# Patient Record
Sex: FEMALE | Race: WHITE
[De-identification: names, ages, dates, MRNs, and addresses within clinical notes are randomized per-mention and may not be internally consistent; named-entity substitution may affect disease eponyms.]

---

## 2018-12-28 ENCOUNTER — HOSPITAL ENCOUNTER (INPATIENT)
Dept: HOSPITAL 74 - JER | Age: 78
LOS: 3 days | Discharge: HOME | DRG: 194 | End: 2018-12-31
Attending: SPECIALIST | Admitting: SPECIALIST
Payer: MEDICARE

## 2018-12-28 VITALS — BODY MASS INDEX: 25.7 KG/M2

## 2018-12-28 DIAGNOSIS — J10.1: Primary | ICD-10-CM

## 2018-12-28 DIAGNOSIS — M48.061: ICD-10-CM

## 2018-12-28 DIAGNOSIS — E86.0: ICD-10-CM

## 2018-12-28 DIAGNOSIS — B96.1: ICD-10-CM

## 2018-12-28 DIAGNOSIS — R55: ICD-10-CM

## 2018-12-28 DIAGNOSIS — N39.0: ICD-10-CM

## 2018-12-28 DIAGNOSIS — E78.5: ICD-10-CM

## 2018-12-28 DIAGNOSIS — F03.90: ICD-10-CM

## 2018-12-28 LAB
ALBUMIN SERPL-MCNC: 3.4 G/DL (ref 3.4–5)
ALP SERPL-CCNC: 72 U/L (ref 45–117)
ALT SERPL-CCNC: 22 U/L (ref 13–61)
ANION GAP SERPL CALC-SCNC: 7 MMOL/L (ref 8–16)
APPEARANCE UR: (no result)
AST SERPL-CCNC: 35 U/L (ref 15–37)
BACTERIA #/AREA URNS HPF: (no result) /HPF
BASOPHILS # BLD: 0.8 % (ref 0–2)
BILIRUB SERPL-MCNC: 0.2 MG/DL (ref 0.2–1)
BILIRUB UR STRIP.AUTO-MCNC: NEGATIVE MG/DL
BUN SERPL-MCNC: 11 MG/DL (ref 7–18)
CALCIUM SERPL-MCNC: 8.4 MG/DL (ref 8.5–10.1)
CHLORIDE SERPL-SCNC: 108 MMOL/L (ref 98–107)
CO2 SERPL-SCNC: 27 MMOL/L (ref 21–32)
COLOR UR: (no result)
CREAT SERPL-MCNC: 0.9 MG/DL (ref 0.55–1.3)
DEPRECATED RDW RBC AUTO: 13.4 % (ref 11.6–15.6)
EOSINOPHIL # BLD: 0.5 % (ref 0–4.5)
EPITH CASTS URNS QL MICRO: (no result) /HPF
GLUCOSE SERPL-MCNC: 150 MG/DL (ref 74–106)
HCT VFR BLD CALC: 37.2 % (ref 32.4–45.2)
HGB BLD-MCNC: 13.1 GM/DL (ref 10.7–15.3)
INR BLD: 1.06 (ref 0.83–1.09)
KETONES UR QL STRIP: (no result)
LEUKOCYTE ESTERASE UR QL STRIP.AUTO: (no result)
LYMPHOCYTES # BLD: 27.6 % (ref 8–40)
MAGNESIUM SERPL-MCNC: 2.4 MG/DL (ref 1.8–2.4)
MCH RBC QN AUTO: 31.7 PG (ref 25.7–33.7)
MCHC RBC AUTO-ENTMCNC: 35.2 G/DL (ref 32–36)
MCV RBC: 90.1 FL (ref 80–96)
MONOCYTES # BLD AUTO: 8.5 % (ref 3.8–10.2)
MUCOUS THREADS URNS QL MICRO: (no result)
NEUTROPHILS # BLD: 62.6 % (ref 42.8–82.8)
NITRITE UR QL STRIP: NEGATIVE
PH UR: 6 [PH] (ref 5–8)
PLATELET # BLD AUTO: 228 K/MM3 (ref 134–434)
PMV BLD: 8.1 FL (ref 7.5–11.1)
POTASSIUM SERPLBLD-SCNC: 4.4 MMOL/L (ref 3.5–5.1)
PROT SERPL-MCNC: 6.6 G/DL (ref 6.4–8.2)
PROT UR QL STRIP: NEGATIVE
PROT UR QL STRIP: NEGATIVE
PT PNL PPP: 12.5 SEC (ref 9.7–13)
RBC # BLD AUTO: 4.13 M/MM3 (ref 3.6–5.2)
SODIUM SERPL-SCNC: 141 MMOL/L (ref 136–145)
SP GR UR: 1.01 (ref 1.01–1.03)
UROBILINOGEN UR STRIP-MCNC: NEGATIVE MG/DL (ref 0.2–1)
WBC # BLD AUTO: 6.2 K/MM3 (ref 4–10)

## 2018-12-28 RX ADMIN — ATORVASTATIN CALCIUM SCH MG: 10 TABLET, FILM COATED ORAL at 21:48

## 2018-12-28 RX ADMIN — RANITIDINE SCH MG: 150 TABLET ORAL at 21:48

## 2018-12-28 NOTE — ECHO
______________________________________________________________________________



Name: ZACHARY CRUZ                                     Exam:Adult Echocardiogram

MRN: [id number]         Study Date: 12/28/2018 03:12 PM

Age: 78 yrs

______________________________________________________________________________



Reason For Study: SYNCOPE

Height: 63 in        Weight: 140 lb        BSA: 1.7 m2



______________________________________________________________________________



MMode/2D Measurements & Calculations

IVSd: 1.0 cm                                            Ao root diam: 2.3 cm

LVIDd: 3.7 cm                                           ACS: 1.9 cm

LVIDs: 2.9 cm

LVPWd: 1.5 cm



_________________________________________________________

EDV(Teich): 58.0 ml                                     LVOT diam: 1.9 cm

ESV(Teich): 31.2 ml



_________________________________________________________

RV S Umesh: 10.7 cm/sec



Doppler Measurements & Calculations

Med Peak E' Umesh: 6.5 cm/sec

Lat Peak E' Umesh: 8.1 cm/sec





______________________________________________________________________________

Left Ventricle

Left ventricular systolic function is normal. Ejection Fraction = 50-55%.

Right Ventricle

The right ventricle is normal in size and function.

Atria

Normal left and right atrial size and function.

Mitral Valve

The mitral valve is normal in structure and function. There is no mitral valve stenosis. There is mil
d mitral

regurgitation.

Tricuspid Valve

The tricuspid valve is normal in structure and function.

Aortic Valve

The aortic valve opens well. There is mild aortic sclerosis.;. No hemodynamically significant valvula
r aortic

stenosis. No aortic regurgitation is present.

Pulmonic Valve

The pulmonic valve is not well seen, but is grossly normal. There is no pulmonic valvular stenosis. T
here is

no pulmonic valvular regurgitation.

Great Vessels

The aortic root is normal size.

Pericardium/Pleura

There is pericardial thickening and/or a small pericardial effusion. The pericardial space suggests o
rganizing

pericardial effusion. There are no echocardiographic indications of cardiac tamponade.



______________________________________________________________________________



Interpretation Summary

Left ventricular systolic function is normal.

Ejection Fraction = 50-55%.

The right ventricle is normal in size and function.

There is mild mitral regurgitation.

The aortic valve opens well.

There is mild aortic sclerosis.;

There is pericardial thickening and/or a small pericardial effusion.

The pericardial space suggests organizing pericardial effusion.

There are no echocardiographic indications of cardiac tamponade.







MD Rand *Vivian 12/28/2018 03:50 PM

## 2018-12-28 NOTE — PDOC
History of Present Illness





- General


Chief Complaint: Syncope/Near Syncope


Stated Complaint: SYNCOPE/NEAR SYNCOPE


Time Seen by Provider: 12/28/18 08:29


History Source: Patient


Exam Limitations: No Limitations





- History of Present Illness


Initial Comments: 





12/28/18 10:13


Patient is a 78-year-old female with past medical history of hyperlipidemia, 

who presents to the emergency department today for syncopal episode occurring 

this morning. She states she got out of bed and was walking to the kitchen when 

she passed out. She states that she does not remember what happened and she 

woke up on the floor. She denies headache, chest pain, difficulty breathing, 

nausea prior to passing out. When she woke up she noticed a large bump to her 

forehead. Pt admits to cold like symptoms over the past three days. 








Past History





- Travel


Traveled outside of the country in the last 30 days: No


Close contact w/someone who was outside of country & ill: No





- Past Medical History


Allergies/Adverse Reactions: 


 Allergies











Allergy/AdvReac Type Severity Reaction Status Date / Time


 


No Known Allergies Allergy   Verified 12/28/18 08:22











Home Medications: 


Ambulatory Orders





Ranitidine [Zantac -] 150 mg PO ASDIR 12/28/18 


Simvastatin [Zocor -] mg PO HS 12/28/18 


Zolpidem Tartrate [Ambien] 5 mg PO HS 12/28/18 








COPD: No


GI Disorders: Yes (reflux)


Hypercholesterolemia: Yes


Other medical history: insomnia





- Surgical History


Appendectomy: Yes





- Suicide/Smoking/Psychosocial Hx


Smoking History: Never smoked


Hx Alcohol Use: No


Drug/Substance Use Hx: No


Substance Use Type: None





**Review of Systems





- Review of Systems


Able to Perform ROS?: Yes


Comments:: 





12/28/18 08:45


CONSTITUTIONAL: 


Absent: fever, chills, diaphoresis, generalized weakness, malaise, loss of 

appetite


HEENT: 


Present: throat pain Absent: rhinorrhea, nasal congestion,throat swelling, 

difficulty swallowing, mouth swelling, ear pain, eye pain, visual Changes


CARDIOVASCULAR: 


Present: LOC Absent: chest pain, lpalpitations, irregular heart rate, 

peripheral edema


RESPIRATORY: 


Present: cough Absent: shortness of breath, dyspnea with exertion, orthopnea, 

wheezing, stridor, hemoptysis


GASTROINTESTINAL:


Present: nausea Absent: abdominal pain, abdominal distension, vomiting, diarrhea

, constipation, melena, hematochezia


GENITOURINARY: 


Absent: dysuria, frequency, urgency, hesitancy, hematuria, flank pain, genital 

pain


MUSCULOSKELETAL: 


Absent: myalgia, arthralgia, joint swelling


SKIN: 


Absent: rash, itching, pallor


HEMATOLOGIC/IMMUNOLOGIC: 


Absent: easy bleeding, easy bruising, lymphadenopathy, frequent infections


ENDOCRINE:


Absent: unexplained weight gain, unexplained weight loss, heat intolerance, 

cold intolerance


NEUROLOGIC: 


Absent: headache, focal weakness or paresthesias, dizziness, unsteady gait, 

seizure, mental status changes, bladder or bowel incontinence


PSYCHIATRIC: 


Absent: anxiety, depression, suicidal or homicidal ideation, hallucinations.


Is the patient limited English proficient: No





*Physical Exam





- Vital Signs


 Last Vital Signs











Temp Pulse Resp BP Pulse Ox


 


 98.3 F   74   20   123/58 L  96 


 


 12/28/18 08:25  12/28/18 08:25  12/28/18 08:25  12/28/18 08:25  12/28/18 08:25














- Physical Exam


Comments: 





12/28/18 08:50


GENERAL:


Well developed, well nourished. Awake and alert. No acute distress. Large 

hematoma to the L forehead.


HEENT:


Normocephalic, atraumatic. PERRLA, EOMI. No conjunctival pallor. Sclera are non-

icteric. Moist mucous membranes. Oropharynx is clear.


NECK: 


Supple. Full ROM. No midline tenderness. No JVD. Carotid pulses 2+ and symmetric

, without bruits. No thyromegaly. No lymphadenopathy.


CARDIOVASCULAR:


Regular rate and rhythm. No murmurs, rubs, or gallops. Distal pulses are 2+ and 

symmetric. 


PULMONARY: 


No evidence of respiratory distress. Lungs clear to auscultation bilaterally. 

No wheezing, rales or rhonchi.


ABDOMINAL:


Soft. Non-tender. Non-distended. No rebound or guarding. No organomegaly. 

Normoactive bowel sounds. 


MUSCULOSKELETAL 


Normal range of motion at all joints. No bony deformities or tenderness. No CVA 

tenderness.


EXTREMITIES: 


No cyanosis. No clubbing. No edema. No calf tenderness.


SKIN: 


Warm and dry. Normal capillary refill. No rashes. No jaundice. 


NEUROLOGICAL: 


Alert, awake, appropriate. Cranial nerves 2-12 intact. No deficits to light 

touch and temperature in face, upper extremities and lower extremities. No 

motor deficits in the in face, upper extremities and lower extremities. 

Normoreflexic in the upper and lower extremities. Normal speech. Toes are down-

going bilaterally. Gait is normal without ataxia.


PSYCHIATRIC: 


Cooperative. Good eye contact. Appropriate mood and affect.





Moderate Sedation





- Procedure Monitoring


Vital Signs: 


Procedure Monitoring Vital Signs











Temperature  98.3 F   12/28/18 08:25


 


Pulse Rate  74   12/28/18 08:25


 


Respiratory Rate  20   12/28/18 08:25


 


Blood Pressure  123/58 L  12/28/18 08:25


 


O2 Sat by Pulse Oximetry (%)  96   12/28/18 08:25











**Heart Score/ECG Review





- History


History: Moderately suspicious





- Electrocardiogram


EKG: Normal





- Age


Age: >/= 65





- Risk Factors


Risk Factors Heart Score: Yes Hx Hypercholesterolemia


Based on the list above the patient has:: 1-2 risk factors





- Troponin


Troponin: </= normal limit





- Score


Heart Score - Total: 4





ED Treatment Course





- LABORATORY


CBC & Chemistry Diagram: 


 12/28/18 09:10





 12/28/18 09:10





Medical Decision Making





- Medical Decision Making





12/28/18 12:34


Pt is a 77 y/o F with PMH of hyperlipidemia, who presents to the ED for a 

syncopal episode this morning. Pt also states she had cold like symptoms for 

the past 3 days





-On exam, pt is AAOx3, neurologically intact. Hematoma to the L scalp. No 

lacerations noted. No pain to neck, trunk or extremities.


-Pt had second near-syncopal episode in the ED. Episode lasted approximately 30 

seconds and pt became bradycardic and diaphoretic. Symptoms resolved 

themselves. 


-Lab work shows no leukocytosis, or shift. H&H is stable. Troponin negative.


-Lactic acid elevated at 2.2; two liters of fluid given.


-Flu A positive


-Tamiflu given


-Head CT and C-Spine CT are negative for fractures, or bleed


-EKG: rate 69 BPM, NSR. Normal intervals and axis. No acute ST-T wave changes


-Suspect syncopal episodes from dehydration related to influenza diagnosis, but 

will admit to tele for further cardiovascular work up


-Case discussed with TRINI Santiago for Dr. Lopez. Agrees to admission.








*DC/Admit/Observation/Transfer


Diagnosis at time of Disposition: 


 Influenza A





Syncope


Qualifiers:


 Syncope type: unspecified Qualified Code(s): R55 - Syncope and collapse








- Discharge Dispostion


Condition at time of disposition: Guarded


Decision to Admit order: Yes





- Referrals





- Patient Instructions





- Post Discharge Activity

## 2018-12-29 LAB
ANION GAP SERPL CALC-SCNC: 5 MMOL/L (ref 8–16)
BASOPHILS # BLD: 0.7 % (ref 0–2)
BUN SERPL-MCNC: 8 MG/DL (ref 7–18)
CALCIUM SERPL-MCNC: 8.3 MG/DL (ref 8.5–10.1)
CHLORIDE SERPL-SCNC: 109 MMOL/L (ref 98–107)
CO2 SERPL-SCNC: 28 MMOL/L (ref 21–32)
CREAT SERPL-MCNC: 0.7 MG/DL (ref 0.55–1.3)
DEPRECATED RDW RBC AUTO: 13.5 % (ref 11.6–15.6)
EOSINOPHIL # BLD: 0.9 % (ref 0–4.5)
GLUCOSE SERPL-MCNC: 84 MG/DL (ref 74–106)
HCT VFR BLD CALC: 33.8 % (ref 32.4–45.2)
HGB BLD-MCNC: 11.9 GM/DL (ref 10.7–15.3)
LYMPHOCYTES # BLD: 58.7 % (ref 8–40)
MCH RBC QN AUTO: 31.6 PG (ref 25.7–33.7)
MCHC RBC AUTO-ENTMCNC: 35.2 G/DL (ref 32–36)
MCV RBC: 89.9 FL (ref 80–96)
MONOCYTES # BLD AUTO: 9.5 % (ref 3.8–10.2)
NEUTROPHILS # BLD: 30.2 % (ref 42.8–82.8)
PLATELET # BLD AUTO: 196 K/MM3 (ref 134–434)
PMV BLD: 7.7 FL (ref 7.5–11.1)
POTASSIUM SERPLBLD-SCNC: 4.3 MMOL/L (ref 3.5–5.1)
RBC # BLD AUTO: 3.77 M/MM3 (ref 3.6–5.2)
SODIUM SERPL-SCNC: 143 MMOL/L (ref 136–145)
WBC # BLD AUTO: 3.2 K/MM3 (ref 4–10)

## 2018-12-29 RX ADMIN — RANITIDINE SCH MG: 150 TABLET ORAL at 21:20

## 2018-12-29 RX ADMIN — OSELTAMIVIR PHOSPHATE SCH MG: 75 CAPSULE ORAL at 21:19

## 2018-12-29 RX ADMIN — RANITIDINE SCH MG: 150 TABLET ORAL at 12:00

## 2018-12-29 RX ADMIN — GUAIFENESIN SCH MG: 600 TABLET, EXTENDED RELEASE ORAL at 21:20

## 2018-12-29 RX ADMIN — ATORVASTATIN CALCIUM SCH MG: 10 TABLET, FILM COATED ORAL at 21:19

## 2018-12-29 RX ADMIN — IPRATROPIUM BROMIDE AND ALBUTEROL SULFATE SCH AMP: .5; 3 SOLUTION RESPIRATORY (INHALATION) at 20:18

## 2018-12-29 NOTE — CON.NEURO
Consult


Consult Specialty:: NEUROLOGY-MYNOR GUAMAN





- History of Present Illness


History of Present Illness: 





Patient is a 78-year-old female with past medical history of hyperlipidemia, 

who presents to the emergency department today for syncopal episode occurring 

this morning. She states she got out of bed and was walking to the kitchen when 

she passed out. She states that she does not remember what happened and she 

woke up on the floor. She denies headache, chest pain, difficulty breathing, 

nausea prior to passing out. When she woke up she noticed a large bump to her 

forehead. Pt admits to cold like symptoms over the past three days. 


Pt. tells me she had a sensation "as if i am going to pass out "(lightheadedness

") before passing out. Denies numbness/weakness/extranneous movements and all 

other neurologic symptoms. 





ED Note-Pt had second near-syncopal episode in the ED. Episode lasted 

approximately 30 seconds and pt became bradycardic and diaphoretic. Symptoms 

resolved themselves. 


-Lab work shows no leukocytosis, or shift. H&H is stable. Troponin negative.


-Lactic acid elevated at 2.2; two liters of fluid given.


-Flu A positive


-Tamiflu given


-Head CT and C-Spine CT are negative for fractures, or bleed


-EKG: rate 69 BPM, NSR. Normal intervals and axis. No acute ST-T wave changes


-Suspect syncopal episodes from dehydration related to influenza diagnosis, but 

will admit to tele for further cardiovascular work up


-Case discussed with TRINI Santiago for Dr. Lopez. Agrees to admission.





- Past Medical History


CNS: Yes: Dementia


...Pregnant: No





- Alcohol/Substance Use


Hx Alcohol Use: No





- Smoking History


Smoking history: Never smoked


Have you smoked in the past 12 months: No





- Social History


ADL: Family Assistance





Home Medications





- Allergies


Allergies/Adverse Reactions: 


 Allergies











Allergy/AdvReac Type Severity Reaction Status Date / Time


 


No Known Allergies Allergy   Verified 12/28/18 08:22














- Home Medications


Home Medications: 


Ambulatory Orders





Ranitidine [Zantac -] 150 mg PO ASDIR 12/28/18 


Simvastatin [Zocor -] mg PO HS 12/28/18 


Zolpidem Tartrate [Ambien] 5 mg PO HS 12/28/18 











Physical Exam-Neuro


Vital Signs: 


 Vital Signs











Temperature  97.9 F   12/29/18 06:00


 


Pulse Rate  58 L  12/29/18 06:00


 


Respiratory Rate  18   12/29/18 06:00


 


Blood Pressure  127/63   12/29/18 06:00


 


O2 Sat by Pulse Oximetry (%)  96   12/29/18 09:00











Labs: 


 CBC, BMP





 12/29/18 07:00 





 12/29/18 07:00 





 INR, PTT











INR  1.06  (0.83-1.09)   12/28/18  09:10    














- Neuro Exam


Level Of Consciousness: Yes: Alert, Oriented to Person, Oriented to Place, 

Oriented to Time


Mini Mental Exam: Intact att/conc


DTR's: 1+ Left Brachioradialis, 1+ Right Brachioradialis, 1+ Left Achilles, 1+ 

Right Achilles, 2+ Left Bicep, 2+ Right Bicep, 2+ Left Tricep, 2+ Right Tricep


Motor Strength: 5/5: Left Arm, Right Arm, Left Leg, Right Leg


Gait: Normal





Imaging





- Results


Cat Scan: Report Reviewed (Without acute abn.)





Assessment/Plan





Pt. appears to have had a syncopal episode?? dehydration/influenza. No evid. 

she had a seizure. Would cont. current care, please call as needed.





Thank Razia gonsalez MD

## 2018-12-29 NOTE — HP
Admitting History and Physical





- Admission


History of Present Illness: 








Patient is a 78-year-old female with past medical history of hyperlipidemia, 

who presents to the emergency department today for syncopal episode occurring 

this morning. She states she got out of bed and was walking to the kitchen when 

she passed out. She states that she does not remember what happened and she 

woke up on the floor. She denies headache, chest pain, difficulty breathing, 

nausea prior to passing out. When she woke up she noticed a large bump to her 

forehead. Pt admits to cold like symptoms over the past three days. 





History Source: Patient, Family Member, Medical Record


Limitations to Obtaining History: Poor Historian





- Past Medical History


CNS: Yes: Dementia


Reproductive: Yes: Postmenopausal


...Pregnant: No





- Smoking History


Smoking history: Never smoked


Have you smoked in the past 12 months: No





- Alcohol/Substance Use


Hx Alcohol Use: No





- Social History


Usual Living Arrangement: Yes: With Child


ADL: Family Assistance





Home Medications





- Allergies


Allergies/Adverse Reactions: 


 Allergies











Allergy/AdvReac Type Severity Reaction Status Date / Time


 


No Known Allergies Allergy   Verified 12/28/18 08:22














- Home Medications


Home Medications: 


Ambulatory Orders





Ranitidine [Zantac -] 150 mg PO ASDIR 12/28/18 


Simvastatin [Zocor -] mg PO HS 12/28/18 


Zolpidem Tartrate [Ambien] 5 mg PO HS 12/28/18 











Review of Systems





- Review of Systems


Constitutional: reports: Chills, Fever, Loss of Appetite, Malaise, Weakness


Eyes: reports: No Symptoms


HENT: reports: No Symptoms


Neck: reports: No Symptoms


Cardiovascular: denies: Chest Pain, Palpitations


Respiratory: reports: Cough, Wheezing.  denies: SOB, SOB on Exertion


Gastrointestinal: reports: No Symptoms


Genitourinary: reports: No Symptoms


Breasts: reports: No Symptoms Reported


Musculoskeletal: reports: No Symptoms


Integumentary: reports: No Symptoms


Neurological: reports: Pre-Existing Deficit


Hematology/Lymphatic: reports: No Symptoms


Psychiatric: reports: Depression





Physical Examination


Vital Signs: 


 Vital Signs











Temperature  97.9 F   12/29/18 06:00


 


Pulse Rate  58 L  12/29/18 06:00


 


Respiratory Rate  18   12/29/18 06:00


 


Blood Pressure  127/63   12/29/18 06:00


 


O2 Sat by Pulse Oximetry (%)  96   12/29/18 09:00











Labs: 


 CBC, BMP





 12/29/18 07:00 





 12/29/18 07:00 











Problem List





- Problems


(1) Dementia


Code(s): F03.90 - UNSPECIFIED DEMENTIA WITHOUT BEHAVIORAL DISTURBANCE   





(2) Influenza A


Code(s): J10.1 - FLU DUE TO OTH IDENT INFLUENZA VIRUS W OTH RESP MANIFEST   





(3) Syncope


Code(s): R55 - SYNCOPE AND COLLAPSE   


Qualifiers: 


   Syncope type: unspecified   Qualified Code(s): R55 - Syncope and collapse   





(4) Spinal stenosis at L4-L5 level


Code(s): M48.06 - SPINAL STENOSIS, LUMBAR REGION * DO NOT USE *

## 2018-12-29 NOTE — PN
Progress Note (short form)





- Note


Progress Note: 





patient seen and examined in room 


daughter at bedside 


coughing 


tokerating PO well





 Vital Signs











 Period  Temp  Pulse  Resp  BP Sys/Acosta  Pulse Ox


 


 Last 24 Hr  97.9 F-99.4 F  52-72  18-20  110-134/57-74  96-98








neck  supple


heart  S1/S2 re


lung  coarse rhonchi bilat 


abd  soft non tender 


ext  no edema 


      no calf tenderness 








 CBC, BMP





 12/29/18 07:00 





 12/29/18 07:00 





= influenza ++





 Microbiology





12/28/18 11:30   Urine - Urine Clean Catch   Urine Culture - Preliminary


                            Lactose Fermenting Neg Bacilli





Active Medications





Albuterol/Ipratropium (Duoneb -)  1 amp NEB RQID KRYSTIN


Atorvastatin Calcium (Lipitor -)  10 mg PO HS [facility]


   Last Admin: 12/28/18 21:48 Dose:  10 mg


Guaifenesin (Mucinex -)  600 mg PO BID KRYSTIN


Oseltamivir Phosphate (Tamiflu -)  75 mg PO BID KRYSTIN


   Stop: 01/03/19 21:59


Ranitidine HCl (Zantac -)  150 mg PO BID [facility]


   Last Admin: 12/29/18 12:00 Dose:  150 mg


Zolpidem Tartrate (Ambien -)  5 mg PO HS PRN


   PRN Reason: INSOMNIA





   # Syncope 


      2/2 dehydaration / influenza 





   # INfluenza 


      tamiflu 


      nebulizer QID





probable  d/c  in am 














Problem List





- Problems


(1) Dementia


Code(s): F03.90 - UNSPECIFIED DEMENTIA WITHOUT BEHAVIORAL DISTURBANCE   





(2) Influenza A


Code(s): J10.1 - FLU DUE TO OTH IDENT INFLUENZA VIRUS W OTH RESP MANIFEST   





(3) Syncope


Code(s): R55 - SYNCOPE AND COLLAPSE   


Qualifiers: 


   Syncope type: unspecified   Qualified Code(s): R55 - Syncope and collapse   





(4) Spinal stenosis at L4-L5 level


Code(s): M48.06 - SPINAL STENOSIS, LUMBAR REGION * DO NOT USE *

## 2018-12-30 RX ADMIN — OSELTAMIVIR PHOSPHATE SCH MG: 75 CAPSULE ORAL at 10:43

## 2018-12-30 RX ADMIN — CEFTRIAXONE SCH MLS/HR: 1 INJECTION, POWDER, FOR SOLUTION INTRAMUSCULAR; INTRAVENOUS at 15:15

## 2018-12-30 RX ADMIN — OSELTAMIVIR PHOSPHATE SCH MG: 75 CAPSULE ORAL at 21:13

## 2018-12-30 RX ADMIN — METHYLPREDNISOLONE SODIUM SUCCINATE SCH MG: 40 INJECTION, POWDER, FOR SOLUTION INTRAMUSCULAR; INTRAVENOUS at 15:15

## 2018-12-30 RX ADMIN — IPRATROPIUM BROMIDE AND ALBUTEROL SULFATE SCH AMP: .5; 3 SOLUTION RESPIRATORY (INHALATION) at 12:00

## 2018-12-30 RX ADMIN — METHYLPREDNISOLONE SODIUM SUCCINATE SCH MG: 40 INJECTION, POWDER, FOR SOLUTION INTRAMUSCULAR; INTRAVENOUS at 20:13

## 2018-12-30 RX ADMIN — GUAIFENESIN SCH MG: 600 TABLET, EXTENDED RELEASE ORAL at 10:43

## 2018-12-30 RX ADMIN — ATORVASTATIN CALCIUM SCH MG: 10 TABLET, FILM COATED ORAL at 21:13

## 2018-12-30 RX ADMIN — GUAIFENESIN SCH MG: 600 TABLET, EXTENDED RELEASE ORAL at 21:13

## 2018-12-30 RX ADMIN — IPRATROPIUM BROMIDE AND ALBUTEROL SULFATE SCH AMP: .5; 3 SOLUTION RESPIRATORY (INHALATION) at 16:19

## 2018-12-30 RX ADMIN — RANITIDINE SCH MG: 150 TABLET ORAL at 21:13

## 2018-12-30 RX ADMIN — IPRATROPIUM BROMIDE AND ALBUTEROL SULFATE SCH AMP: .5; 3 SOLUTION RESPIRATORY (INHALATION) at 09:00

## 2018-12-30 RX ADMIN — RANITIDINE SCH MG: 150 TABLET ORAL at 10:43

## 2018-12-30 NOTE — PN
Progress Note (short form)





- Note


Progress Note: 





patient seen and examined in room 


comfortable 


reports has not been able to sleep over night due to wheezing 


on Neb treatment 


just had treatment  and remains with coarse wheezing 


 Vital Signs











 Period  Temp  Pulse  Resp  BP Sys/Acosta  Pulse Ox


 


 Last 24 Hr  97.6 F-98.3 F  52-63  18-20  134-158/57-86  96-99

















 


neck  supple


heart  S1/S2 re


lung  coarse rhonchi bilat 


abd  soft non tender 


ext  no edema 


      no calf tenderness 


 














 CBC, BMP





 12/29/18 07:00 





 12/29/18 07:00 





= influenza ++





  Microbiology





12/28/18 11:30   Urine - Urine Clean Catch   Urine Culture - Final


                            Klebsiella Pneumoniae





Active Medications





Albuterol/Ipratropium (Duoneb -)  1 amp NEB RQID KRYSTIN


   Last Admin: 12/30/18 12:00 Dose:  1 amp


Albuterol/Ipratropium (Duoneb -)  1 amp NEB Q4H PRN


   PRN Reason: SHORTNESS OF BREATH


Atorvastatin Calcium (Lipitor -)  10 mg PO HS KRYSTIN


   Last Admin: 12/29/18 21:19 Dose:  10 mg


Guaifenesin (Mucinex -)  600 mg PO BID KRYSTIN


   Last Admin: 12/30/18 10:43 Dose:  600 mg


Ceftriaxone Sodium 1 gm/ (Dextrose)  100 mls @ 200 mls/hr IVPB DAILY [facility]; 

Protocol


Methylprednisolone Sodium Succinate (Solu-Medrol -)  40 mg IVPUSH Q8H-IV KRYSTIN


Oseltamivir Phosphate (Tamiflu -)  75 mg PO BID KRYSTIN


   Stop: 01/03/19 21:59


   Last Admin: 12/30/18 10:43 Dose:  75 mg


Ranitidine HCl (Zantac -)  150 mg PO BID KRYSTIN


   Last Admin: 12/30/18 10:43 Dose:  150 mg


Zolpidem Tartrate (Ambien -)  5 mg PO HS PRN


   PRN Reason: INSOMNIA








   # Syncope 


      2/2 dehydaration / influenza 





   # INfluenza / bronchospasm 


      tamiflu / expectorant 


      add IV steroids 


      nebulizer QID and PRN 





   # UTI


      Klebsiella  S-Rocephin 


      start today 


























Problem List





- Problems


(1) Dementia


Code(s): F03.90 - UNSPECIFIED DEMENTIA WITHOUT BEHAVIORAL DISTURBANCE   





(2) Influenza A


Code(s): J10.1 - FLU DUE TO OTH IDENT INFLUENZA VIRUS W OTH RESP MANIFEST   





(3) Syncope


Code(s): R55 - SYNCOPE AND COLLAPSE   


Qualifiers: 


   Syncope type: unspecified   Qualified Code(s): R55 - Syncope and collapse   





(4) Spinal stenosis at L4-L5 level


Code(s): M48.06 - SPINAL STENOSIS, LUMBAR REGION * DO NOT USE *

## 2018-12-31 VITALS — HEART RATE: 81 BPM | DIASTOLIC BLOOD PRESSURE: 65 MMHG | TEMPERATURE: 97.9 F | SYSTOLIC BLOOD PRESSURE: 148 MMHG

## 2018-12-31 LAB
ANION GAP SERPL CALC-SCNC: 8 MMOL/L (ref 8–16)
BASOPHILS # BLD: 0.1 % (ref 0–2)
BUN SERPL-MCNC: 17 MG/DL (ref 7–18)
CALCIUM SERPL-MCNC: 9.2 MG/DL (ref 8.5–10.1)
CHLORIDE SERPL-SCNC: 108 MMOL/L (ref 98–107)
CO2 SERPL-SCNC: 26 MMOL/L (ref 21–32)
CREAT SERPL-MCNC: 0.8 MG/DL (ref 0.55–1.3)
DEPRECATED RDW RBC AUTO: 13.1 % (ref 11.6–15.6)
EOSINOPHIL # BLD: 0 % (ref 0–4.5)
GLUCOSE SERPL-MCNC: 144 MG/DL (ref 74–106)
HCT VFR BLD CALC: 38.4 % (ref 32.4–45.2)
HGB BLD-MCNC: 12.6 GM/DL (ref 10.7–15.3)
LYMPHOCYTES # BLD: 23.3 % (ref 8–40)
MCH RBC QN AUTO: 29.8 PG (ref 25.7–33.7)
MCHC RBC AUTO-ENTMCNC: 32.7 G/DL (ref 32–36)
MCV RBC: 91.1 FL (ref 80–96)
MONOCYTES # BLD AUTO: 2.1 % (ref 3.8–10.2)
NEUTROPHILS # BLD: 74.5 % (ref 42.8–82.8)
PLATELET # BLD AUTO: 220 K/MM3 (ref 134–434)
PMV BLD: 8.3 FL (ref 7.5–11.1)
POTASSIUM SERPLBLD-SCNC: 4.8 MMOL/L (ref 3.5–5.1)
RBC # BLD AUTO: 4.21 M/MM3 (ref 3.6–5.2)
SODIUM SERPL-SCNC: 142 MMOL/L (ref 136–145)
WBC # BLD AUTO: 3.8 K/MM3 (ref 4–10)

## 2018-12-31 RX ADMIN — RANITIDINE SCH MG: 150 TABLET ORAL at 10:11

## 2018-12-31 RX ADMIN — OSELTAMIVIR PHOSPHATE SCH MG: 75 CAPSULE ORAL at 10:12

## 2018-12-31 RX ADMIN — IPRATROPIUM BROMIDE AND ALBUTEROL SULFATE SCH AMP: .5; 3 SOLUTION RESPIRATORY (INHALATION) at 11:21

## 2018-12-31 RX ADMIN — METHYLPREDNISOLONE SODIUM SUCCINATE SCH MG: 40 INJECTION, POWDER, FOR SOLUTION INTRAMUSCULAR; INTRAVENOUS at 01:49

## 2018-12-31 RX ADMIN — CEFTRIAXONE SCH MLS/HR: 1 INJECTION, POWDER, FOR SOLUTION INTRAMUSCULAR; INTRAVENOUS at 10:11

## 2018-12-31 RX ADMIN — GUAIFENESIN SCH MG: 600 TABLET, EXTENDED RELEASE ORAL at 10:11

## 2018-12-31 RX ADMIN — IPRATROPIUM BROMIDE AND ALBUTEROL SULFATE SCH AMP: .5; 3 SOLUTION RESPIRATORY (INHALATION) at 07:47

## 2018-12-31 RX ADMIN — METHYLPREDNISOLONE SODIUM SUCCINATE SCH MG: 40 INJECTION, POWDER, FOR SOLUTION INTRAMUSCULAR; INTRAVENOUS at 10:11

## 2018-12-31 NOTE — DS
Physical Examination


Vital Signs: 


 Vital Signs











Temperature  98.1 F   12/31/18 05:23


 


Pulse Rate  59 L  12/31/18 05:23


 


Respiratory Rate  18   12/31/18 05:23


 


Blood Pressure  146/65   12/31/18 05:23


 


O2 Sat by Pulse Oximetry (%)  96   12/30/18 20:52











Constitutional: Yes: Well Nourished


Eyes: Yes: Conjunctiva Clear


HENT: Yes: Normocephalic, Other (Hematoma lt side of forehead)


Cardiovascular: Yes: Regular Rate and Rhythm


Respiratory: Yes: Regular, CTA Bilaterally


Gastrointestinal: Yes: Normal Bowel Sounds, Soft


...Rectal Exam: Yes: Deferred


Renal/: Yes: WNL


Breast(s): Yes: WNL


Musculoskeletal: Yes: WNL


Extremities: Yes: WNL


Edema: No


Peripheral Pulses WNL: Yes


Integumentary: Yes: WNL


Neurological: Yes: Alert, Oriented


...Motor Strength: WNL


Psychiatric: Yes: Alert, Oriented


Labs: 


 CBC, BMP





 12/31/18 05:30 





 12/31/18 05:30 











Discharge Summary


Reason For Visit: INFLUENZA TYPE A; SYNCOPE


Current Active Problems





Dementia (Acute)


Influenza A (Acute)


Syncope (Acute)








Hospital Course: 





79 y/o female with PMHx of HLD was admitted s/p fall after syncopal episode. 

Treated for flu. Pos UTI. DC home with Bactrim, Tamiflu and


Nebulizer txs. VNS referral for safety/ fall precautions.


Condition: Good





- Instructions


Referrals: 


Sulma Lopez MD [Primary Care Provider] - 





- Home Medications


Comprehensive Discharge Medication List: 


Ambulatory Orders





Ranitidine [Zantac -] 150 mg PO ASDIR 12/28/18 


Simvastatin [Zocor -] mg PO HS 12/28/18 


Zolpidem Tartrate [Ambien] 5 mg PO HS 12/28/18

## 2020-08-18 ENCOUNTER — HOSPITAL ENCOUNTER (INPATIENT)
Dept: HOSPITAL 74 - JER | Age: 80
LOS: 4 days | Discharge: HOME | DRG: 69 | End: 2020-08-22
Attending: INTERNAL MEDICINE | Admitting: INTERNAL MEDICINE
Payer: COMMERCIAL

## 2020-08-18 VITALS — BODY MASS INDEX: 31.8 KG/M2

## 2020-08-18 DIAGNOSIS — G45.9: Primary | ICD-10-CM

## 2020-08-18 DIAGNOSIS — Z90.710: ICD-10-CM

## 2020-08-18 DIAGNOSIS — R29.810: ICD-10-CM

## 2020-08-18 DIAGNOSIS — E78.1: ICD-10-CM

## 2020-08-18 DIAGNOSIS — Z90.49: ICD-10-CM

## 2020-08-18 DIAGNOSIS — E78.5: ICD-10-CM

## 2020-08-18 DIAGNOSIS — M48.061: ICD-10-CM

## 2020-08-18 DIAGNOSIS — E11.9: ICD-10-CM

## 2020-08-18 DIAGNOSIS — F03.90: ICD-10-CM

## 2020-08-18 DIAGNOSIS — Z86.19: ICD-10-CM

## 2020-08-18 LAB
ALBUMIN SERPL-MCNC: 3.6 G/DL (ref 3.4–5)
ALP SERPL-CCNC: 72 U/L (ref 45–117)
ALT SERPL-CCNC: 26 U/L (ref 13–61)
ANION GAP SERPL CALC-SCNC: 5 MMOL/L (ref 8–16)
APPEARANCE UR: CLEAR
APTT BLD: 30.8 SECONDS (ref 25.2–36.5)
AST SERPL-CCNC: 23 U/L (ref 15–37)
BASOPHILS # BLD: 0.2 % (ref 0–2)
BILIRUB SERPL-MCNC: 0.2 MG/DL (ref 0.2–1)
BILIRUB UR STRIP.AUTO-MCNC: NEGATIVE MG/DL
BUN SERPL-MCNC: 11.8 MG/DL (ref 7–18)
CALCIUM SERPL-MCNC: 9 MG/DL (ref 8.5–10.1)
CHLORIDE SERPL-SCNC: 107 MMOL/L (ref 98–107)
CHOLEST SERPL-MCNC: 169 MG/DL (ref 50–200)
CO2 SERPL-SCNC: 28 MMOL/L (ref 21–32)
COLOR UR: YELLOW
CREAT SERPL-MCNC: 0.7 MG/DL (ref 0.55–1.3)
DEPRECATED RDW RBC AUTO: 12.6 % (ref 11.6–15.6)
EOSINOPHIL # BLD: 2.2 % (ref 0–4.5)
GLUCOSE SERPL-MCNC: 117 MG/DL (ref 74–106)
HCT VFR BLD CALC: 40.5 % (ref 32.4–45.2)
HDLC SERPL-MCNC: 51 MG/DL (ref 40–60)
HGB BLD-MCNC: 13.5 GM/DL (ref 10.7–15.3)
INR BLD: 0.96 (ref 0.83–1.09)
KETONES UR QL STRIP: NEGATIVE
LDLC SERPL CALC-MCNC: 89 MG/DL (ref 5–100)
LEUKOCYTE ESTERASE UR QL STRIP.AUTO: NEGATIVE
LYMPHOCYTES # BLD: 31.6 % (ref 8–40)
MCH RBC QN AUTO: 30.8 PG (ref 25.7–33.7)
MCHC RBC AUTO-ENTMCNC: 33.2 G/DL (ref 32–36)
MCV RBC: 92.7 FL (ref 80–96)
MONOCYTES # BLD AUTO: 9.3 % (ref 3.8–10.2)
NEUTROPHILS # BLD: 56.7 % (ref 42.8–82.8)
NITRITE UR QL STRIP: NEGATIVE
PH UR: 5.5 [PH] (ref 5–8)
PLATELET # BLD AUTO: 274 K/MM3 (ref 134–434)
PMV BLD: 8.2 FL (ref 7.5–11.1)
POTASSIUM SERPLBLD-SCNC: 4.1 MMOL/L (ref 3.5–5.1)
PROT SERPL-MCNC: 6.9 G/DL (ref 6.4–8.2)
PROT UR QL STRIP: NEGATIVE
PROT UR QL STRIP: NEGATIVE
PT PNL PPP: 11.3 SEC (ref 9.7–13)
RBC # BLD AUTO: 4.37 M/MM3 (ref 3.6–5.2)
SODIUM SERPL-SCNC: 140 MMOL/L (ref 136–145)
SP GR UR: 1.01 (ref 1.01–1.03)
TRIGL SERPL-MCNC: 294 MG/DL (ref 0–150)
UROBILINOGEN UR STRIP-MCNC: 0.2 MG/DL (ref 0.2–1)
WBC # BLD AUTO: 7.1 K/MM3 (ref 4–10)

## 2020-08-18 PROCEDURE — U0003 INFECTIOUS AGENT DETECTION BY NUCLEIC ACID (DNA OR RNA); SEVERE ACUTE RESPIRATORY SYNDROME CORONAVIRUS 2 (SARS-COV-2) (CORONAVIRUS DISEASE [COVID-19]), AMPLIFIED PROBE TECHNIQUE, MAKING USE OF HIGH THROUGHPUT TECHNOLOGIES AS DESCRIBED BY CMS-2020-01-R: HCPCS

## 2020-08-18 RX ADMIN — PANTOPRAZOLE SODIUM SCH MG: 40 TABLET, DELAYED RELEASE ORAL at 20:23

## 2020-08-18 RX ADMIN — RAMIPRIL SCH MG: 2.5 CAPSULE ORAL at 21:13

## 2020-08-18 RX ADMIN — OMEGA-3-ACID ETHYL ESTERS SCH GM: 1 CAPSULE, LIQUID FILLED ORAL at 21:13

## 2020-08-18 RX ADMIN — ENOXAPARIN SODIUM SCH MG: 40 INJECTION SUBCUTANEOUS at 20:23

## 2020-08-18 NOTE — HP
Admitting History and Physical





- Admission


Chief Complaint: Acute onset of slurred speech and right sided facial droop.


History of Present Illness: 





This 79 yr old  female with PMH of Covid 19 infection in 5/2020, 

intermittent chest tightness and dyspnea on mild physical exertion of 3 to 4 

months duration, s/p appendectomy, hysterectomy, laser eye surgery admitted via 

ER with an acute onset of slurred speech and right facial droop.


History Source: Patient, Family Member, Medical Record


Limitations to Obtaining History: No Limitations





- Past Medical History


CNS: Yes: Dementia


Cardiovascular: Yes: Other (Chest tightness on mild physical exertion)


Pulmonary: Yes: Other (Dyspnea on mild physical exertion)


Gastrointestinal: No: Ascites, Cancer, Constipation, Crohn's Disease, 

Diverticulitis, Diverticulosis, Esophageal Varices, Gastritis, GERD, GI Bleed, 

Hemorrhoids, Hiatal Hernia, Inflamatory Bowel Disease, Irritable Bowel Disease, 

Pancreatitis, Peptic Ulcer Disease, Ulcerative Colitis, Other


Hepatobiliary: No: Cirrhosis, Cholelithiasis, Cholecystitis, 

Choledocholithiasis, Hepatitis A, Hepatitis B, Hepatitis C, Other


Renal/: No: Renal Failure, Renal Inusuff, BPH, Cancer, Hematuria, 

Hemodialysis, Neurogenic Bladder, Renal Calculi, UTI, Other


Reproductive: No: Ectopic Pregnancy, Endometriosis, Fibroids, PID, Polycystic 

Ovary Syndrome, Postmenopausal, Other


...Pregnant: No


Heme/Onc: No: Anemia, B12 Deficiency, Bleeding Disorder, Cancer, Current 

Chemotherapy, Current Radiation Therapy, Hemochromatosis, Hypercoaguable State, 

Myeloproliferative Synd, Sickle Cell Disease, Sickle Cell Trait, 

Thrombocytopenia, Other


Infectious Disease: Yes: Other (Personal hx of Covid 19 infection in 5/20.)


Psych: No: Addictions, Anxiety, Bipolar, Depression, Panic, Psychosis, 

Schizophrenia, Other


Musculoskeletal: No: Bursitis, Chronic low back pain, Hemiparesis, Hemiplegia, 

Osteoarthritis, Paraplegia, Other


Rheumatology: No: Fibromyalgia, Gout, Lupus, Rheumatoid Arthritis, Sarcoidosis, 

Vasculitis, Other


ENT: No: Allergic Rhinitis, Sinusitis, Other


Endocrine: No: Harlan's Disease, Cushing's Disease, Diabetes Insipidus, 

Diabetes Mellitus, Hyperparathyroidism, Hyperthyroidism, Hypothyroidism, 

Osteopenia, SIADH, Other


Dermatology: No: Basal Cell, Cellulitis, Eczema, Melanoma, Psoriasis, Squamous 

Cell, Other





- Past Surgical History


Past Surgical History: Yes: Appendectomy, Hysterectomy





- Smoking History


Smoking history: Never smoked


Have you smoked in the past 12 months: No





- Alcohol/Substance Use


Hx Alcohol Use: No





- Social History


ADL: Family Assistance





Home Medications





- Allergies


Allergies/Adverse Reactions: 


                                    Allergies











Allergy/AdvReac Type Severity Reaction Status Date / Time


 


No Known Allergies Allergy   Verified 04/13/20 14:27














- Home Medications


Home Medications: 


Ambulatory Orders





Simvastatin [Zocor -] 40 mg PO HS 12/28/18 


Ascorbic Acid [Vitamin C -] 500 mg PO BID #14 tablet 04/16/20 


Hydroxychloroquine So4 [Plaquenil -] 200 mg PO BID #4 tablet 04/16/20 


Zinc Sulfate [Orazinc] 220 mg PO BID #14 capsule 04/16/20 











Review of Systems





- Review of Systems


Constitutional: reports: No Symptoms


Eyes: reports: No Symptoms


HENT: reports: No Symptoms


Neck: reports: No Symptoms


Cardiovascular: reports: Other (chest tightness on physical exertion)


Respiratory: reports: SOB on Exertion


Gastrointestinal: reports: No Symptoms


Genitourinary: reports: No Symptoms


Breasts: reports: No Symptoms Reported


Musculoskeletal: reports: No Symptoms


Integumentary: reports: No Symptoms


Neurological: reports: Change in Speech, Other (right facial droop)


Endocrine: reports: No Symptoms


Hematology/Lymphatic: reports: No Symptoms


Psychiatric: reports: No Symptoms





Physical Examination


Vital Signs: 


                                   Vital Signs











Temperature  98.4 F   08/18/20 15:52


 


Pulse Rate  80   08/18/20 15:52


 


Respiratory Rate  20   08/18/20 15:52


 


Blood Pressure  175/80 H  08/18/20 15:52


 


O2 Sat by Pulse Oximetry (%)  97   08/18/20 15:52











Constitutional: Yes: Well Nourished, No Distress, Calm


Eyes: Yes: Conjunctiva Clear, EOM Intact


HENT: Yes: Atraumatic, Normocephalic


Neck: Yes: Supple, Trachea Midline


Cardiovascular: Yes: Regular Rate and Rhythm


Respiratory: Yes: Regular, CTA Bilaterally


Gastrointestinal: Yes: Normal Bowel Sounds, Soft


...Rectal Exam: Yes: Deferred


Renal/: Yes: WNL


Breast(s): Yes: WNL


Musculoskeletal: Yes: WNL


Extremities: Yes: WNL


Edema: No


Peripheral Pulses WNL: Yes


Integumentary: Yes: WNL


Neurological: Yes: Facial Droop (right)


...Motor Strength: WNL


Psychiatric: Yes: WNL


Labs: 


                                    CBC, BMP





                                 08/18/20 15:50 





                                 08/18/20 15:50 











Imaging





- Results


Cat Scan: Report Reviewed


MRI: Report Reviewed


EKG: Report Reviewed


Other: Report Reviewed (lab data reviewed)





Problem List





- Problems


(1) H/O hysterectomy for benign disease


Code(s): Z90.710 - ACQUIRED ABSENCE OF BOTH CERVIX AND UTERUS   





(2) History of appendectomy


Code(s): Z90.49 - ACQUIRED ABSENCE OF OTHER SPECIFIED PARTS OF DIGESTIVE TRACT  

 





(3) Chest tightness


Code(s): R07.89 - OTHER CHEST PAIN   





(4) Dyspnea


Code(s): R06.00 - DYSPNEA, UNSPECIFIED   





(5) SANTOS (dyspnea on exertion)


Code(s): R06.00 - DYSPNEA, UNSPECIFIED   





(6) Transient ischemic attack


Code(s): G45.9 - TRANSIENT CEREBRAL ISCHEMIC ATTACK, UNSPECIFIED   





(7) COVID-19 virus infection


Code(s): U07.1 - COVID POSITIVE   





(8) Dementia


Code(s): F03.90 - UNSPECIFIED DEMENTIA WITHOUT BEHAVIORAL DISTURBANCE   





(9) Spinal stenosis at L4-L5 level


Code(s): M48.06 - SPINAL STENOSIS, LUMBAR REGION * DO NOT USE *   





(10) Suspected 2019 novel coronavirus infection


Code(s): R68.89 - OTHER GENERAL SYMPTOMS AND SIGNS   





Assessment/Plan





Assessment/plan: acute TIA, acute slurred speech and right facial droop, HTN, 

personal hx of Covid 19 infection, chest tightness and dyspnea on physical 

exertion, HLD, hypertriglyceridemia; DVT/GI prophylaxis with SQ Lovenox and 

Pantoprazole, atorvastatin for HLD, Ramipril for HTN, consult to Neurology and 

Cardiology, carotid doppler, aspirin, omega 3 for hypertriglyceridemia.

## 2020-08-18 NOTE — PDOC
Attending Attestation





- Resident


Resident Name: Lynn Moya





- ED Attending Attestation


I have performed the following: I have examined & evaluated the patient, The 

case was reviewed & discussed with the resident, I agree w/resident's findings &

plan





- HPI


HPI: 





08/18/20 16:16


79 year old female with history of hypercholesterolemia presenting with 

transient episode of rt facial droop and slurred speech x 10 minutes, since 

resolved








- Physicial Exam


PE: 





08/18/20 16:17


Agree with the resident's HPI and PE as documented in the electronic medical 

record.


NAD, well appearing, EOMI, PERRL,  nl conjunctiva, anicteric; neck supple. lungs

clear, RRR, abdomen soft nontender. no rebound, guarding. Back nontender. BROWN 

x4, no focal neuro deficits. Alert, oriented to person time and place. No 

carotid bruit, CN II-XII grossly intact. Strength prox and distally 5/5 

throughout. Sensation grossly intact to light touch. BROWN x4. No cerebellar 

signs, no dysmetria, bilateral finger to nose and heel to shin equal and 

symmetric. Speech clear. 


   


No peripheral edema. normal color for ethnicity, [facility].








- Medical Decision Making





08/18/20 16:17


Vital Signs











Temp Pulse Resp BP Pulse Ox


 


 98.4 F   80   20   175/80 H  97 


 


 08/18/20 15:52  08/18/20 15:52  08/18/20 15:52  08/18/20 15:52  08/18/20 15:52





Vital signs reviewed, within normal limits, mildly hypertensive 175/80.  

Otherwise no acute distress,


No focal neurologic deficits at this time as documented


She is able to ambulate, gait is stable


Initial head CT without acute intracranial abnormalities.


ASA


antilipid therapy.


Dr Rivera consult for suspected TIA


We will admit for TIA work-up, neuro consultation and admission to tele/stroke, 

inpatient eval/MRI 


08/19/20 16:23








Discharge





- Discharge Information


Problems reviewed: Yes


Clinical Impression/Diagnosis: 


 Transient ischemic attack, SANTOS (dyspnea on exertion)





Condition: Stable





- Admission


Yes





- Follow up/Referral





- Patient Discharge Instructions





- Post Discharge Activity

## 2020-08-18 NOTE — PDOC
History of Present Illness





- General


Chief Complaint: CVA/TIA


Stated Complaint: POSSIBLE CVA


Time Seen by Provider: 08/18/20 15:47





Past History





- Medical History


Allergies/Adverse Reactions: 


                                    Allergies











Allergy/AdvReac Type Severity Reaction Status Date / Time


 


No Known Allergies Allergy   Verified 04/13/20 14:27











Home Medications: 


Ambulatory Orders





Simvastatin [Zocor -] 40 mg PO HS 12/28/18 


Ascorbic Acid [Vitamin C -] 500 mg PO BID #14 tablet 04/16/20 


Hydroxychloroquine So4 [Plaquenil -] 200 mg PO BID #4 tablet 04/16/20 


Zinc Sulfate [Orazinc] 220 mg PO BID #14 capsule 04/16/20 








COPD: No


GI Disorders: Yes (reflux)


Hypercholesterolemia: Yes





- Surgical History


Appendectomy: Yes





- Reproductive History


Is Patient Pregnant Now?: No





- Psycho-Social/Smoking History


Smoking History: Never smoked


Have you smoked in the past 12 months: No


Information on smoking cessation initiated: No





- Substance Abuse Hx (Audit-C & DAST Scrn)


How often the patient has a drink containing alcohol: Never


Score: In Men: 4 or > Positive; In Women: 3 or > Positive: 0


Screen Result (Pos requires Nsg. Audit-10AR): Negative


In the last yr the pt used illegal drug/Rx for NonMed reason: No


Score:  Yes response is considered Positive: 0


Screen Result (Positive result requires Nsg. DAST-10): Negative





*Physical Exam





- Vital Signs


                                Last Vital Signs











Temp Pulse Resp BP Pulse Ox


 


 98.4 F   80   20   175/80 H  97 


 


 08/18/20 15:52  08/18/20 15:52  08/18/20 15:52  08/18/20 15:52  08/18/20 15:52














NIH Stroke Scale





- Last Known Well Date/Time & Onset


Date Last Known Well: 08/18/20


Time Last Known Well: 14:30





- Initial Evaluation


Level of consciousness: Alert


Ask patient the month and their age: Answers both correctly


Ask patient to open & close eyes; make fist and let go: Obeys both correctly


Best gaze (horizontal eye movement): Normal


Visual field testing: No visual field loss


Facial paresis (Show teeth/raise eyebrows/close eyes tight): Normal symmetrical 

movement


Motor Function: Left Arm: Normal


Motor Function:  Right Arm: Normal (extends arm 90 (or 45) degrees for 10 

seconds without drift


Motor Function:  Left Leg: Normal (extends leg 30 degrees for 5 seconds without 

drift)


Motor Function:  Right Leg: Normal (extends leg 30 degrees for 5 seconds without

 drift)


Limb Ataxia: No ataxia


Sensory(Use pinprick test arms,legs,trunk,face/side to side): Normal


Best language (Describe picture, name items, read sentences): No Aphasia


Dysarthria (read several words): Normal articulation


Extinction and Inattention: No abnormality





- Total Score


NIH Stroke Scale Score: 0





tPA Exclusion Checklist 0-3hr





- Time Elapsed


Date last known well: 08/18/20


Time last known well: 14:30


Elaspsed time:  Day(s) and 3 Hour(s) and 25 Minutes 





- Thrombolytic Therapy Candidate


Is the patient eligible for Thrombolytic Therapy?: No





- Relative Exclusion Criteria 0-3h


Stroke severity too mild (non-disabling): Yes





- Ineligibility reason(s)


Reasons No tPA given: See reason(s) noted above (Severity too mild, rapid 

resolution of sx)





TIA Risk Factors





- ABCD Score


Age: Age = or > 60


Blood Pressure: DBP =/> 90


Clinical Features of TIA: Uni wk w/wo speech impair


Duration: TIA duration 10-59 min


Diabetes: No


Total ABCD2 Score (0-7):: 5





Critical Care Time/MDM Note





- Medical Decision Making


Note: 





08/18/20 16:11


HPI:


78yo [place]-speaking F no PMH, no meds, BIBA from home for concern for CVA/TIA.

 USOH earlier today. Sitting relaxing with friends, LKN 1430, sudden onset 

slurred speech and R-sided facial droop noticed by friends, self-resolved in 

10min. EMS arrived and pt was back at baseline, no neuro deficits, no slurred 

speech or facial droop. Pt states she felt a little weak and her mouth felt weak

 and her speech was slurred. Denies numbness/tingling, vision changes, weakness 

in arms or legs, headache, lightheadedness, vertigo, syncope, seizure, 

confusion, difficulty walking, hx CVA/TIA, hx heart or lung issues, CP, SOB, 

N/V, F/C, abdominal pain, trauma, head injury, back pain, neck pain. Pt does 

endorse 2mo worsening dyspnea and fatigue on exertion, especially walking up 

hills. Pt has never been to a cardiologist but requests for a cardiologist to 

see her while she's admitted here.


Last PCP appt 2mo ago.





PCP - Sulma Lopez





ROS:


Constitutional: Negative for chills, fever, fatigue, diaphoresis.


HENT: Negative for sore throat, rhinorrhea, congestion.


Eyes: Negative for visual disturbance.


Respiratory: Negative for shortness of breath, cough, and wheezing.


Cardiovascular: Positive for SANTOS. Negative for chest pain, palpitations, and leg

 swelling.


Gastrointestinal: Negative for abdominal pain, blood in stool, constipation, 

diarrhea, nausea, and vomiting.


Genitourinary: Negative for dysuria, flank pain, and hematuria.


Musculoskeletal: Negative for myalgias, back pain, and neck pain.


Skin: Negative for rash.


Neurological: Positive for facial droop, slurred speech. Negative for light-

headedness, dizziness, vertigo, syncope, weakness, numbness and headaches.


Psychiatric/Behavioral: Negative for behavioral problems and confusion.





PE:


Gen: Alert, NAD, comfortable-appearing.


HEENT: PERRL, EOMI, MMM, NCAT. No conjunctival pallor. Sclera are non-icteric. 

Neck supple, no carotid bruits.


CV: Regular rate and rhythm. No murmurs, rubs, or gallops.


PULM: No resp distress. CTAB, no wheezes, rales, or rhonchi.


ABD: soft, NT/ND, no rebound tenderness or guarding, no CVA tenderness.


BACK: No TTP of c/t/l-spine. No step-offs or deformities.


MSK: No bony deformities. 2+ pulses in all extremities.


NEURO: AAOx3. PERRL. CN 2-12 intact. 5/5 strength in all extremities. Sensation 

to light touch intact in all extremities. No pronator drift. No dysmetria. No 

dysdiadochokinesia. No abnormal nystagmus. Normal gait.


EXTREMITIES: No cyanosis. No clubbing. No edema. No calf tenderness.


PSYCH: Normal mood and thought pattern.


SKIN: Warm and dry. Normal capillary refill. No rashes. No jaundice.





MDM:


78yo F no PMH, no meds, BIBA from home for concern for CVA/TIA. USOH earlier 

today. Sitting relaxing with friends, LKN 1430, sudden onset slurred speech and 

R-sided facial droop noticed by friends, self-resolved in 10min.


Hypertensive 175/80, otherwise hemodynamically stable, afebrile, neurologically 

intact.





Code gray called. .





NIHSS 0 prior to and after CTH, ABCD 5. Due to rapid resolution of symptoms, pt 

not a TPA candidate.





Ddx: TIA/CVA, ICH, metabolic derangement, anemia, infection, PNA, UTI, CHF, 

ACS/MI, arrhythmia, thyroid pathology





-EKG


-CXR


-CTH


-CBC,CMP,Cardiac profile,Lipid profile,Coags,TSH,UA/UC


-Neuro consult


-Dispo: admit for stroke r/o





08/18/20 16:22


CTH reviewed: negative for acute pathology


-Aspirin 325mg


-Discuss dual antiplatelet therapy (clopidogrel 300mg PO) with neuro consult





08/18/20 16:26


Discussed case with Neurologist Dr Rivera


-Lipitor 80


-MRI brain w/o contrast


-US carotid arteries


-Does not recommend dual antiplatelet therapy. Just aspirin at this time.





08/18/20 16:34


EKG reviewed: normal sinus rhythm, 65bpm, normal axis, normal intervals, no 

TWIs, no ST elevations or depressions





08/18/20 17:13


CXR reviewed: no acute pathology





08/18/20 17:32


Labs reviewed. No concerning findings.


Presentation most c/w TIA.


Admit for TIA/CVA workup - call placed to Dr Lopez's office, signed out to Dr Leroy





Discharge





- Discharge Information


Problems reviewed: Yes


Clinical Impression/Diagnosis: 


 Transient ischemic attack, SANTOS (dyspnea on exertion)





Condition: Stable





- Admission


Yes





- Follow up/Referral





- Patient Discharge Instructions





- Post Discharge Activity

## 2020-08-19 LAB
ALBUMIN SERPL-MCNC: 3.3 G/DL (ref 3.4–5)
ALP SERPL-CCNC: 62 U/L (ref 45–117)
ALT SERPL-CCNC: 23 U/L (ref 13–61)
ANION GAP SERPL CALC-SCNC: 7 MMOL/L (ref 8–16)
AST SERPL-CCNC: 20 U/L (ref 15–37)
BASOPHILS # BLD: 1 % (ref 0–2)
BILIRUB SERPL-MCNC: 0.6 MG/DL (ref 0.2–1)
BUN SERPL-MCNC: 11.7 MG/DL (ref 7–18)
CALCIUM SERPL-MCNC: 8.7 MG/DL (ref 8.5–10.1)
CHLORIDE SERPL-SCNC: 109 MMOL/L (ref 98–107)
CO2 SERPL-SCNC: 28 MMOL/L (ref 21–32)
CREAT SERPL-MCNC: 0.8 MG/DL (ref 0.55–1.3)
DEPRECATED RDW RBC AUTO: 13.3 % (ref 11.6–15.6)
EOSINOPHIL # BLD: 2.4 % (ref 0–4.5)
GLUCOSE SERPL-MCNC: 87 MG/DL (ref 74–106)
HCT VFR BLD CALC: 37.7 % (ref 32.4–45.2)
HGB BLD-MCNC: 12.6 GM/DL (ref 10.7–15.3)
LYMPHOCYTES # BLD: 35.7 % (ref 8–40)
MCH RBC QN AUTO: 30.4 PG (ref 25.7–33.7)
MCHC RBC AUTO-ENTMCNC: 33.5 G/DL (ref 32–36)
MCV RBC: 90.8 FL (ref 80–96)
MONOCYTES # BLD AUTO: 8.1 % (ref 3.8–10.2)
NEUTROPHILS # BLD: 52.8 % (ref 42.8–82.8)
PLATELET # BLD AUTO: 259 K/MM3 (ref 134–434)
PMV BLD: 7.9 FL (ref 7.5–11.1)
POTASSIUM SERPLBLD-SCNC: 4.1 MMOL/L (ref 3.5–5.1)
PROT SERPL-MCNC: 6.3 G/DL (ref 6.4–8.2)
RBC # BLD AUTO: 4.15 M/MM3 (ref 3.6–5.2)
SODIUM SERPL-SCNC: 144 MMOL/L (ref 136–145)
WBC # BLD AUTO: 6.1 K/MM3 (ref 4–10)

## 2020-08-19 RX ADMIN — RAMIPRIL SCH MG: 2.5 CAPSULE ORAL at 10:28

## 2020-08-19 RX ADMIN — OMEGA-3-ACID ETHYL ESTERS SCH GM: 1 CAPSULE, LIQUID FILLED ORAL at 21:14

## 2020-08-19 RX ADMIN — ENOXAPARIN SODIUM SCH MG: 40 INJECTION SUBCUTANEOUS at 09:15

## 2020-08-19 RX ADMIN — OMEGA-3-ACID ETHYL ESTERS SCH GM: 1 CAPSULE, LIQUID FILLED ORAL at 09:15

## 2020-08-19 RX ADMIN — ASPIRIN 81 MG SCH MG: 81 TABLET ORAL at 12:25

## 2020-08-19 RX ADMIN — PANTOPRAZOLE SODIUM SCH MG: 40 TABLET, DELAYED RELEASE ORAL at 09:15

## 2020-08-19 RX ADMIN — ATORVASTATIN CALCIUM SCH MG: 80 TABLET, FILM COATED ORAL at 21:14

## 2020-08-19 NOTE — PN
Progress Note, Physician


Chief Complaint: 





Patient seen and examined at the bedside, no acute events from last night, 

afebrile, no dyspnea or chest pain.


History of Present Illness: 





This 79 yr old  female with PMH of HTN, hypertriglyceridemia, s/p Covid 

19 infection in May of 2020, dyspnea and chest tightness on physical exertion 

admitted via ER with an acute TIA, acute slurred speech and right facial droop.





- Current Medication List


Current Medications: 


Active Medications





Atorvastatin Calcium (Lipitor -)  20 mg PO [facility]


Enoxaparin Sodium (Lovenox -)  40 mg SQ DAILY [facility]


   Last Admin: 08/19/20 09:15 Dose:  40 mg


   Documented by: 


Omega-3-Acid Ethyl Esters (Lovaza -)  2 gm PO BID [facility]


   Last Admin: 08/19/20 09:15 Dose:  2 gm


   Documented by: 


Pantoprazole Sodium (Protonix -)  40 mg PO DAILY [facility]


   Last Admin: 08/19/20 09:15 Dose:  40 mg


   Documented by: 


Ramipril (Altace -)  2.5 mg PO DAILY [facility]


   Last Admin: 08/19/20 10:28 Dose:  2.5 mg


   Documented by: 











- Objective


Vital Signs: 


                                   Vital Signs











Temperature  97.8 F   08/19/20 09:11


 


Pulse Rate  71   08/19/20 09:11


 


Respiratory Rate  16   08/19/20 09:11


 


Blood Pressure  136/64   08/19/20 09:11


 


O2 Sat by Pulse Oximetry (%)  96   08/19/20 09:11











Constitutional: Yes: Well Nourished, No Distress, Calm


Eyes: Yes: Conjunctiva Clear, EOM Intact


HENT: Yes: Atraumatic, Normocephalic


Neck: Yes: Supple, Trachea Midline


Cardiovascular: Yes: Regular Rate and Rhythm


Respiratory: Yes: Regular, CTA Bilaterally


Gastrointestinal: Yes: Normal Bowel Sounds, Soft


...Rectal Exam: Yes: Deferred


Genitourinary: Yes: WNL


Breast(s): Yes: WNL


Musculoskeletal: Yes: WNL


Extremities: Yes: WNL


Edema: No


Peripheral Pulses WNL: Yes


Integumentary: Yes: WNL


Neurological: Yes: WNL


...Motor Strength: WNL


Psychiatric: Yes: WNL


Labs: 


                                    CBC, BMP





                                 08/19/20 06:02 





                                 08/19/20 06:02 





                                    INR, PTT











INR  0.96  (0.83-1.09)   08/18/20  15:50    














- ....Imaging


Other: Report Reviewed (lab data reviewed)





Problem List





- Problems


(1) H/O hysterectomy for benign disease


Code(s): Z90.710 - ACQUIRED ABSENCE OF BOTH CERVIX AND UTERUS   





(2) History of appendectomy


Code(s): Z90.49 - ACQUIRED ABSENCE OF OTHER SPECIFIED PARTS OF DIGESTIVE TRACT  







(3) Chest tightness


Code(s): R07.89 - OTHER CHEST PAIN   





(4) Dyspnea


Code(s): R06.00 - DYSPNEA, UNSPECIFIED   





(5) SANTOS (dyspnea on exertion)


Code(s): R06.00 - DYSPNEA, UNSPECIFIED   





(6) Transient ischemic attack


Code(s): G45.9 - TRANSIENT CEREBRAL ISCHEMIC ATTACK, UNSPECIFIED   





(7) COVID-19 virus infection


Code(s): U07.1 - COVID POSITIVE   





(8) Dementia


Code(s): F03.90 - UNSPECIFIED DEMENTIA WITHOUT BEHAVIORAL DISTURBANCE   





(9) Spinal stenosis at L4-L5 level


Code(s): M48.06 - SPINAL STENOSIS, LUMBAR REGION * DO NOT USE *   





(10) Suspected 2019 novel coronavirus infection


Code(s): R68.89 - OTHER GENERAL SYMPTOMS AND SIGNS   





Assessment/Plan





Assessment/plan: acute TIA, acute slurred speech and right facial droop, dyspnea

and chest tightness on physical exertion, HTN, hypertriglyceridemia; DVT/GI prop

hylaxis with SQ Lovenox and Pantoprazole, atorvastatin for HLD, omega 3 for 

hypertriglyceridemia, Ramipril for HTN, consult to Neurology and Cardiology 

pending, physical therapy.

## 2020-08-19 NOTE — CON.NEURO
Consult





- Past Medical History


CNS: Yes: Dementia


Cardio/Vascular: Yes: Other (Chest tightness on mild physical exertion)


Pulmonary: Yes: Other (Dyspnea on mild physical exertion)


Gastrointestinal: No: Ascites, Cancer, Constipation, Crohn's Disease, 

Diverticulitis, Diverticulosis, Esophageal Varices, Gastritis, GERD, GI Bleed, 

Hemorrhoids, Hiatal Hernia, Inflamatory Bowel Disease, Irritable Bowel Disease, 

Pancreatitis, Peptic Ulcer Disease, Ulcerative Colitis, Other


Hepatobiliary: No: Cirrhosis, Cholelithiasis, Cholecystitis, 

Choledocholithiasis, Hepatitis A, Hepatitis B, Hepatitis C, Other


Renal/: No: Renal Failure, Renal Inusuff, BPH, Cancer, Hematuria, 

Hemodialysis, Neurogenic Bladder, Renal Calculi, UTI, Other


...Pregnant: No


Infectious Disease: Yes: Other (Personal hx of Covid 19 infection in 5/20.)


Psych: No: Addictions, Anxiety, Bipolar, Depression, Panic, Psychosis, 

Schizophrenia, Other


Musculoskeletal: No: Bursitis, Chronic low back pain, Hemiparesis, Hemiplegia, 

Osteoarthritis, Paraplegia, Other


Rheumatology: No: Fibromyalgia, Gout, Lupus, Rheumatoid Arthritis, Sarcoidosis, 

Vasculitis, Other


ENT: No: Allergic Rhinitis, Sinusitis, Other


Endocrine: No: Harlan's Disease, Cushing's Disease, Diabetes Insipidus, 

Diabetes Mellitus, Hyperparathyroidism, Hyperthyroidism, Hypothyroidism, 

Osteopenia, SIADH, Other


Dermatology: No: Basal Cell, Cellulitis, Eczema, Melanoma, Psoriasis, Squamous 

Cell, Other





- Past Surgical History


Past Surgical History: Yes: Appendectomy, Hysterectomy





- Alcohol/Substance Use


Hx Alcohol Use: No





- Smoking History


Smoking history: Never smoked


Have you smoked in the past 12 months: No





- Social History


ADL: Family Assistance





Home Medications





- Allergies


Allergies/Adverse Reactions: 


                                    Allergies











Allergy/AdvReac Type Severity Reaction Status Date / Time


 


No Known Allergies Allergy   Verified 04/13/20 14:27














- Home Medications


Home Medications: 


Ambulatory Orders





Simvastatin [Zocor -] 40 mg PO HS 12/28/18 


Ascorbic Acid [Vitamin C -] 500 mg PO BID #14 tablet 04/16/20 


Hydroxychloroquine So4 [Plaquenil -] 200 mg PO BID #4 tablet 04/16/20 


Zinc Sulfate [Orazinc] 220 mg PO BID #14 capsule 04/16/20 











Physical Exam-Neuro


Vital Signs: 


                                   Vital Signs











Temperature  97.9 F   08/19/20 18:00


 


Pulse Rate  65   08/19/20 18:00


 


Respiratory Rate  20   08/19/20 18:00


 


Blood Pressure  137/75   08/19/20 18:00


 


O2 Sat by Pulse Oximetry (%)  97   08/19/20 18:00











Labs: 


                                    CBC, BMP





                                 08/19/20 06:02 





                                 08/19/20 06:02 





                                    INR, PTT











INR  0.96  (0.83-1.09)   08/18/20  15:50    














Assessment/Plan


cc Slurring of speech, facial droopiness , which resolved.


HPI 79 Year old Mexican speaking female, saw with her daughter. She came to 

hospital or slurring of speech and facial droopiness , which resolved.


She was taking simvastatin 40 mg at home, no aspirin. Patient denies smoking or 

previous tia. She has ct head which was unremarkable, mri of brain is 

uremarkable. Her carotid shows 70 percent stenosis.


Patient is feeling back to normal.


No other focal neurological symptoms 


                                    Allergies











Allergy/AdvReac Type Severity Reaction Status Date / Time


 


No Known Allergies Allergy   Verified 04/13/20 14:27











Home Medications: 








Simvastatin [Zocor -] 40 mg PO HS 12/28/18 


Ascorbic Acid [Vitamin C -] 500 mg PO BID #14 tablet 04/16/20 


Hydroxychloroquine So4 [Plaquenil -] 200 mg PO BID #4 tablet 04/16/20 


Zinc Sulfate [Orazinc] 220 mg PO BID #14 capsule 04/16/20 





- Vital Signs


                                Last Vital Signs











Temp Pulse Resp BP Pulse Ox


 


 98.4 F   80   20   175/80 H  97 


 


 08/18/20 15:52  08/18/20 15:52  08/18/20 15:52  08/18/20 15:52  08/18/20 15:52

















Neurological examination


Alert oriented x 2, speech is normal, vss, afebrile


eomi, pupils reactive and no face asymmetry


moving all ext


sensation is normal








ct head, mri is normal


carotid ultrasound left ica 70 percent stenosis





Assessment/Plan TIA, with left ica stenosis





Plan: incresae lipitor ot 80 mg once for at least one month


- vascular surgery consult


- life style modifications


- tele monitoring


- discussed with mother and daughter





Thanking you so much


Bryan Rivera MD

## 2020-08-19 NOTE — EKG
Test Reason : 

Blood Pressure : ***/*** mmHG

Vent. Rate : 065 BPM     Atrial Rate : 065 BPM

   P-R Int : 146 ms          QRS Dur : 086 ms

    QT Int : 398 ms       P-R-T Axes : 001 010 058 degrees

   QTc Int : 413 ms

 

NORMAL SINUS RHYTHM

CANNOT RULE OUT INFERIOR INFARCT (CITED ON OR BEFORE 28-DEC-2018)

ABNORMAL ECG

WHEN COMPARED WITH ECG OF 13-APR-2020 14:45,

QUESTIONABLE CHANGE IN INITIAL FORCES OF INFERIOR LEADS

Confirmed by Tanmay Marsh MD ([id number]) on 8/19/2020 9:22:48 AM

 

Referred By:             Confirmed By:Tanmay Marsh MD

## 2020-08-19 NOTE — CON.CARD
Consult


Consult Specialty:: Cardiology


Referred by:: Dr. Castellano


Reason for Consultation:: chest tightness, dyspnea, TIA





- History of Present Illness


Chief Complaint: chest tightness, dyspnea


History of Present Illness: 





This 79 yr old  female with PMH of Covid 19 infection in 5/2020, 

hyperlipidemia, intermittent chest tightness and dyspnea on mild physical 

exertion of 3 to 4 months duration, s/p appendectomy, hysterectomy, laser eye 

surgery admitted via ER with an acute onset of slurred speech and right facial 

droop lasting 10 minutes, since resolved, brain MRI w/o acute strokes, carotid 

US showed LICA 50-70%, she is currently asymptomatic, denies near or true 

syncope, palpitations, orthopnea, PND or LE edema.








- History Source


History Provided By: Patient


Limitations to Obtaining History: No Limitations





- Past Medical History


CNS: Yes: Dementia


Cardio/Vascular: Yes: Other (Chest tightness on mild physical exertion)


Pulmonary: Yes: Other (Dyspnea on mild physical exertion)


Gastrointestinal: No: Ascites, Cancer, Constipation, Crohn's Disease, 

Diverticulitis, Diverticulosis, Esophageal Varices, Gastritis, GERD, GI Bleed, 

Hemorrhoids, Hiatal Hernia, Inflamatory Bowel Disease, Irritable Bowel Disease, 

Pancreatitis, Peptic Ulcer Disease, Ulcerative Colitis, Other


Hepatobiliary: No: Cirrhosis, Cholelithiasis, Cholecystitis, 

Choledocholithiasis, Hepatitis A, Hepatitis B, Hepatitis C, Other


Renal/: No: Renal Failure, Renal Inusuff, BPH, Cancer, Hematuria, 

Hemodialysis, Neurogenic Bladder, Renal Calculi, UTI, Other


...Pregnant: No


Infectious Disease: Yes: Other (Personal hx of Covid 19 infection in 5/20.)


Psych: No: Addictions, Anxiety, Bipolar, Depression, Panic, Psychosis, 

Schizophrenia, Other


Musculoskeletal: No: Bursitis, Chronic low back pain, Hemiparesis, Hemiplegia, 

Osteoarthritis, Paraplegia, Other


Rheumatology: No: Fibromyalgia, Gout, Lupus, Rheumatoid Arthritis, Sarcoidosis, 

Vasculitis, Other


ENT: No: Allergic Rhinitis, Sinusitis, Other


Endocrine: No: Harlan's Disease, Cushing's Disease, Diabetes Insipidus, 

Diabetes Mellitus, Hyperparathyroidism, Hyperthyroidism, Hypothyroidism, Oste

openia, SIADH, Other


Dermatology: No: Basal Cell, Cellulitis, Eczema, Melanoma, Psoriasis, Squamous 

Cell, Other





- Past Surgical History


Past Surgical History: Yes: Appendectomy, Hysterectomy





- Alcohol/Substance Use


Hx Alcohol Use: No





- Smoking History


Smoking history: Never smoked


Have you smoked in the past 12 months: No





- Social History


ADL: Family Assistance





Home Medications





- Allergies


Allergies/Adverse Reactions: 


                                    Allergies











Allergy/AdvReac Type Severity Reaction Status Date / Time


 


No Known Allergies Allergy   Verified 04/13/20 14:27














- Home Medications


Home Medications: 


Ambulatory Orders





Simvastatin [Zocor -] 40 mg PO HS 12/28/18 


Ascorbic Acid [Vitamin C -] 500 mg PO BID #14 tablet 04/16/20 


Hydroxychloroquine So4 [Plaquenil -] 200 mg PO BID #4 tablet 04/16/20 


Zinc Sulfate [Orazinc] 220 mg PO BID #14 capsule 04/16/20 











Review of Systems





- Review of Systems


Cardiovascular: reports: Chest Pain


Respiratory: reports: SOB on Exertion


Neurological: reports: Change in Speech


Vital Signs: 


                                   Vital Signs











Temperature  97.8 F   08/19/20 09:11


 


Pulse Rate  71   08/19/20 09:11


 


Respiratory Rate  16   08/19/20 09:11


 


Blood Pressure  136/64   08/19/20 09:11


 


O2 Sat by Pulse Oximetry (%)  96   08/19/20 09:11











Constitutional: Yes: No Distress, Calm


Neck: Yes: Supple


Respiratory: Yes: Regular, CTA Bilaterally


Gastrointestinal: Yes: Normal Bowel Sounds, Soft


Cardiovascular: Yes: Regular Rate and Rhythm


JVD: No


Carotid Bruit: No


Heart Sounds: Yes: S1, S2


Edema: No





- Other Data


Labs, Other Data: 


                                    CBC, BMP





                                 08/19/20 06:02 





                                 08/19/20 06:02 





                                    INR, PTT











INR  0.96  (0.83-1.09)   08/18/20  15:50    








                                  Troponin, BNP











  08/18/20





  15:50


 


Troponin I  < 0.02








                                  Troponin, BNP











  08/18/20





  15:50


 


Troponin I  < 0.02














NSR @ 65 w/o ST-T changes


Tele No PAF


Echo: Pending


Ejection Fraction %: LVEF > or = 40 %





Imaging





- Results


Chest X-ray: Report Reviewed (NAD)





Problem List





- Problems


(1) Mixed hyperlipidemia


Code(s): E78.2 - MIXED HYPERLIPIDEMIA   





(2) Chest tightness


Code(s): R07.89 - OTHER CHEST PAIN   





(3) SANTOS (dyspnea on exertion)


Code(s): R06.00 - DYSPNEA, UNSPECIFIED   





(4) Transient ischemic attack


Code(s): G45.9 - TRANSIENT CEREBRAL ISCHEMIC ATTACK, UNSPECIFIED   





(5) Dementia


Code(s): F03.90 - UNSPECIFIED DEMENTIA WITHOUT BEHAVIORAL DISTURBANCE   


Qualifiers: 


   Dementia type: unspecified type   Dementia behavioral disturbance: without 

behavioral disturbance   Qualified Code(s): F03.90 - Unspecified dementia 

without behavioral disturbance   





(6) Hypertensive heart disease


Code(s): I11.9 - HYPERTENSIVE HEART DISEASE WITHOUT HEART FAILURE   


Qualifiers: 


   Heart failure presence: without heart failure   Qualified Code(s): I11.9 - 

Hypertensive heart disease without heart failure   





Assessment/Plan


12/28/2018 Echo: Normal LV size and fxn, mild MR





1. Acute slurred speech and right facial droop c/w acute TIA since resolved


2. SANTOS and chest tightness with exertion r/o CAD


3. HTN


4. Mixed hyperlipidemia





P1. Continue Lipitor 20 qd, Lovaza 2 bid, ramipril 2.5 qd, ASA 81 qd, f/u repeat

 echo, telemetry monitor r/o PAF


2. Pharm stress as outpatient r/o CAD


3. Extended arrhythmia monitoring as outpatient if PAF not seen on telemetry


4. Encourage ambulation, PT as tolerated


5. Thank you for consultative opportunity

## 2020-08-20 RX ADMIN — PANTOPRAZOLE SODIUM SCH MG: 40 TABLET, DELAYED RELEASE ORAL at 10:26

## 2020-08-20 RX ADMIN — ATORVASTATIN CALCIUM SCH MG: 80 TABLET, FILM COATED ORAL at 21:15

## 2020-08-20 RX ADMIN — OMEGA-3-ACID ETHYL ESTERS SCH GM: 1 CAPSULE, LIQUID FILLED ORAL at 21:15

## 2020-08-20 RX ADMIN — OMEGA-3-ACID ETHYL ESTERS SCH GM: 1 CAPSULE, LIQUID FILLED ORAL at 10:26

## 2020-08-20 RX ADMIN — RAMIPRIL SCH MG: 2.5 CAPSULE ORAL at 10:25

## 2020-08-20 RX ADMIN — ENOXAPARIN SODIUM SCH MG: 40 INJECTION SUBCUTANEOUS at 10:26

## 2020-08-20 RX ADMIN — ASPIRIN 81 MG SCH MG: 81 TABLET ORAL at 10:25

## 2020-08-20 NOTE — PN
Progress Note, Physician


History of Present Illness: 





No further recurrence of slurred speech and right facial droop lasting 10 

minutes, since resolved, brain MRI w/o acute strokes, carotid US showed LICA 50-

70, she is currently asymptomatic, denies near or true syncope, palpitations, 

orthopnea, PND or LE edema.








- Current Medication List


Current Medications: 


Active Medications





Aspirin (Asa -)  81 mg PO DAILY [facility]


   Last Admin: 08/19/20 12:25 Dose:  81 mg


   Documented by: 


Atorvastatin Calcium (Lipitor -)  80 mg PO HS [facility]


   Last Admin: 08/19/20 21:14 Dose:  80 mg


   Documented by: 


Enoxaparin Sodium (Lovenox -)  40 mg SQ DAILY [facility]


   Last Admin: 08/19/20 09:15 Dose:  40 mg


   Documented by: 


Omega-3-Acid Ethyl Esters (Lovaza -)  2 gm PO BID [facility]


   Last Admin: 08/19/20 21:14 Dose:  2 gm


   Documented by: 


Pantoprazole Sodium (Protonix -)  40 mg PO DAILY [facility]


   Last Admin: 08/19/20 09:15 Dose:  40 mg


   Documented by: 


Ramipril (Altace -)  2.5 mg PO DAILY [facility]


   Last Admin: 08/19/20 10:28 Dose:  2.5 mg


   Documented by: 











- Objective


Vital Signs: 


                                   Vital Signs











Temperature  97.6 F   08/20/20 06:00


 


Pulse Rate  61   08/20/20 06:00


 


Respiratory Rate  18   08/20/20 06:00


 


Blood Pressure  129/57 L  08/20/20 06:00


 


O2 Sat by Pulse Oximetry (%)  96   08/20/20 06:00











Constitutional: Yes: No Distress, Calm


Neck: Yes: Supple, Other (Left bruits)


Cardiovascular: Yes: Regular Rate and Rhythm


Respiratory: Yes: Regular, CTA Bilaterally


Gastrointestinal: Yes: Normal Bowel Sounds, Soft


Extremities: Yes: Internal Rotation


Labs: 


                                    CBC, BMP





                                 08/19/20 06:02 





                                 08/19/20 06:02 





                                    INR, PTT











INR  0.96  (0.83-1.09)   08/18/20  15:50    














- ....Imaging


EKG: Report Reviewed (Tele: NSR no PAF)





Problem List





- Problems


(1) Mixed hyperlipidemia


Code(s): E78.2 - MIXED HYPERLIPIDEMIA   





(2) Chest tightness


Code(s): R07.89 - OTHER CHEST PAIN   





(3) SANTOS (dyspnea on exertion)


Code(s): R06.00 - DYSPNEA, UNSPECIFIED   





(4) Transient ischemic attack


Code(s): G45.9 - TRANSIENT CEREBRAL ISCHEMIC ATTACK, UNSPECIFIED   





(5) Dementia


Code(s): F03.90 - UNSPECIFIED DEMENTIA WITHOUT BEHAVIORAL DISTURBANCE   


Qualifiers: 


   Dementia type: unspecified type   Dementia behavioral disturbance: without 

behavioral disturbance   Qualified Code(s): F03.90 - Unspecified dementia 

without behavioral disturbance   





(6) Hypertensive heart disease


Code(s): I11.9 - HYPERTENSIVE HEART DISEASE WITHOUT HEART FAILURE   


Qualifiers: 


   Heart failure presence: without heart failure   Qualified Code(s): I11.9 - 

Hypertensive heart disease without heart failure   





(7) Stenosis of left internal carotid artery


Code(s): I65.22 - OCCLUSION AND STENOSIS OF LEFT CAROTID ARTERY   





Assessment/Plan


8/2020 carotid US showed LICA 50-70%


12/28/2018 Echo: Normal LV size and fxn, mild MR





1. Acute slurred speech and right facial droop c/w acute TIA since resolved


2. SANTOS and chest tightness with exertion r/o CAD


3. HTN


4. Mixed hyperlipidemia


5. 50-70% LICA stenosis





P1. Increased Lipitor 80 qd, Lovaza 2 bid, ramipril 2.5 qd, ASA 81 qd, f/u 

repeat echo, telemetry monitor r/o PAF, f/u neck CTA


2. Pharm stress as outpatient r/o CAD


3. Extended arrhythmia monitoring as outpatient if PAF not seen on telemetry


4. Encourage ambulation, PT as tolerated

## 2020-08-20 NOTE — PN
Progress Note (short form)





- Note


Progress Note: 





c Slurring of speech, facial droopiness , which resolved.


HPI 79 Year old Lebanese speaking female, saw with her daughter. She came to 

hospital or slurring of speech and facial droopiness , which resolved.


She was taking simvastatin 40 mg at home, no aspirin. Patient denies smoking or 

previous tia. She has ct head which was unremarkable, mri of brain is 

uremarkable. Her carotid shows 70 percent stenosis.


Patient is feeling back to normal. continue lipitor and aspirin


vascular surgery consult appreciated 








Neurological examination


Alert oriented x 2, speech is normal, vss, afebrile


eomi, pupils reactive and no face asymmetry


moving all ext


sensation is normal








ct head, mri is normal


carotid ultrasound left ica 70 percent stenosis





Assessment/Plan TIA, with left ica stenosis, no new symptoms 





Plan: continue lipitor and asprin 


- vascular surgery consult


- life style modifications


- tele monitoring


- vascular surgery consult appreciated





Thanking you so much


Bryan Rivera MD

## 2020-08-20 NOTE — PN
Progress Note, Physician


Chief Complaint: 





Patient seen and examined at the bedside, no acute events from last night, no 

facial droop, no slurred speech.


History of Present Illness: 





This 79 yr old  female with PMH of HTN, HLD, s/p Covid 19 infection in 

May of 2020 admitted via ER with an acute TIA, slurred speech, right facial 

droop, and a left internal carotid artery stenosis 70%.





- Current Medication List


Current Medications: 


Active Medications





Aspirin (Asa -)  81 mg PO DAILY [facility]


   Last Admin: 08/19/20 12:25 Dose:  81 mg


   Documented by: 


Atorvastatin Calcium (Lipitor -)  80 mg PO HS [facility]


   Last Admin: 08/19/20 21:14 Dose:  80 mg


   Documented by: 


Enoxaparin Sodium (Lovenox -)  40 mg SQ DAILY [facility]


   Last Admin: 08/19/20 09:15 Dose:  40 mg


   Documented by: 


Omega-3-Acid Ethyl Esters (Lovaza -)  2 gm PO BID [facility]


   Last Admin: 08/19/20 21:14 Dose:  2 gm


   Documented by: 


Pantoprazole Sodium (Protonix -)  40 mg PO DAILY [facility]


   Last Admin: 08/19/20 09:15 Dose:  40 mg


   Documented by: 


Ramipril (Altace -)  2.5 mg PO DAILY [facility]


   Last Admin: 08/19/20 10:28 Dose:  2.5 mg


   Documented by: 











- Objective


Vital Signs: 


                                   Vital Signs











Temperature  97.6 F   08/20/20 06:00


 


Pulse Rate  61   08/20/20 06:00


 


Respiratory Rate  18   08/20/20 06:00


 


Blood Pressure  129/57 L  08/20/20 06:00


 


O2 Sat by Pulse Oximetry (%)  96   08/20/20 06:00











Constitutional: Yes: Well Nourished, No Distress, Calm


Eyes: Yes: Conjunctiva Clear, EOM Intact


HENT: Yes: Atraumatic, Normocephalic


Neck: Yes: Supple, Trachea Midline


Cardiovascular: Yes: Regular Rate and Rhythm


Respiratory: Yes: Regular, CTA Bilaterally


Gastrointestinal: Yes: Normal Bowel Sounds, Soft


...Rectal Exam: Yes: Deferred


Genitourinary: Yes: WNL


Breast(s): Yes: WNL


Musculoskeletal: Yes: WNL


Edema: No


Peripheral Pulses WNL: Yes


Integumentary: Yes: WNL


Neurological: Yes: Facial Droop (right facial droop resolved)


...Motor Strength: WNL


Psychiatric: Yes: WNL


Labs: 


                                    CBC, BMP





                                 08/19/20 06:02 





                                 08/19/20 06:02 





                                    INR, PTT











INR  0.96  (0.83-1.09)   08/18/20  15:50    














- ....Imaging


Other: Report Reviewed (lab data reviewed)





Problem List





- Problems


(1) H/O hysterectomy for benign disease


Code(s): Z90.710 - ACQUIRED ABSENCE OF BOTH CERVIX AND UTERUS   





(2) History of appendectomy


Code(s): Z90.49 - ACQUIRED ABSENCE OF OTHER SPECIFIED PARTS OF DIGESTIVE TRACT  







(3) Chest tightness


Code(s): R07.89 - OTHER CHEST PAIN   





(4) Dyspnea


Code(s): R06.00 - DYSPNEA, UNSPECIFIED   





(5) SANTOS (dyspnea on exertion)


Code(s): R06.00 - DYSPNEA, UNSPECIFIED   





(6) Transient ischemic attack


Code(s): G45.9 - TRANSIENT CEREBRAL ISCHEMIC ATTACK, UNSPECIFIED   





(7) COVID-19 virus infection


Code(s): U07.1 - COVID POSITIVE   





(8) Dementia


Code(s): F03.90 - UNSPECIFIED DEMENTIA WITHOUT BEHAVIORAL DISTURBANCE   


Qualifiers: 


   Dementia type: unspecified type   Dementia behavioral disturbance: without 

behavioral disturbance   Qualified Code(s): F03.90 - Unspecified dementia 

without behavioral disturbance   





(9) Spinal stenosis at L4-L5 level


Code(s): M48.06 - SPINAL STENOSIS, LUMBAR REGION * DO NOT USE *   





(10) Suspected 2019 novel coronavirus infection


Code(s): R68.89 - OTHER GENERAL SYMPTOMS AND SIGNS   





(11) Stenosis of left internal carotid artery


Code(s): I65.22 - OCCLUSION AND STENOSIS OF LEFT CAROTID ARTERY   





Assessment/Plan





Assessment/plan: acute TIA, acute slurred speech and right facial droop, left 

internal carotid artery stenosis 70%, HTN, HLD, s/p Covid 19 infection in May 

2020, dyspnea and chest tightness on physical exertion; SQ Lovenox and aspirin 

for TIA, consult to Vascular Surgeon pending, atorvastatin for HLD, omega 3 for 

hypertriglyceridemia, Ramipril for HTN, DVT/GI prophylaxis, physical therapy.

## 2020-08-20 NOTE — CONSULT
- Consultation


REQUESTING PROVIDER: 





CONSULT REQUEST: We have been asked to surgically evaluate this patient for 

carotid stenosis.





PCP:Rah Leroy





HISTORY OF PRESENT ILLNESS: 80 y/o F w/ PMHx Covid 19 infection in 5/2020, 

hyperlipidemia, admitted for workup after TIA sxs. Pt reports she was a t her 

friends house yesterday sitting at the table having a conversation when she 

began to feel the right side of her face drooping and was unable to speak. 

Reports the sxs lasted 10 minutes prior to resolving completely. Denies ue/le 

weakness or amaurosis fugax, confusion during the event. Denies prior h/o 

cva/tia. Vascular consulted for evaluation. Brain MRI w/o acute strokes, carotid

US shows LICA 50-70%. 


At baseline, pt ambulates without use of assisting devices, able to complete all

adls independently, lives alone, daughter close by. Denies h/o tobacco use. 








PMHx:          as abovee





PSHx:       Appendectomy, Hysterectomy


                                Home Medications











 Medication  Instructions  Recorded


 


Simvastatin [Zocor -] 40 mg PO HS 12/28/18


 


Ascorbic Acid [Vitamin C -] 500 mg PO BID #14 tablet 04/16/20


 


Hydroxychloroquine So4 [Plaquenil 200 mg PO BID #4 tablet 04/16/20





-]  


 


Zinc Sulfate [Orazinc] 220 mg PO BID #14 capsule 04/16/20








                                    Allergies











Allergy/AdvReac Type Severity Reaction Status Date / Time


 


No Known Allergies Allergy   Verified 04/13/20 14:27








REVIEW OF SYSTEMS:


CONSTITUTIONAL: 


Absent:  fever, chills


CARDIOVASCULAR: 


Absent: chest pain, syncope


RESPIRATORY: 


Absent: cough, shortness of breath


GASTROINTESTINAL:


Absent: abdominal pain, abdominal distension








PHYSICAL EXAM:


GENERAL: Awake, alert, and fully oriented, in no acute distress.


HEAD: Normal with no signs of trauma.


LUNGS: No accessory muscle use on RA


ABDOMEN: Soft, nontender, not distended, normoactive bowel sounds, no guarding, 

no rebound


UPPER EXTREMITIES: b/l  strength strong and equal, b/l biceps/triceps 5/5, 

silt b/l ues. B/L les 5/5 dorsi/plantarflexion, silt b/l les 





                                   Vital Signs











Temperature  98.1 F   08/20/20 10:23


 


Pulse Rate  67   08/20/20 10:23


 


Respiratory Rate  18   08/20/20 10:23


 


Blood Pressure  131/70   08/20/20 10:23


 


O2 Sat by Pulse Oximetry (%)  96   08/20/20 10:23








                                   Lab Results











WBC  6.1 K/mm3 (4.0-10.0)   08/19/20  06:02    


 


RBC  4.15 M/mm3 (3.60-5.2)   08/19/20  06:02    


 


Hgb  12.6 GM/dL (10.7-15.3)   08/19/20  06:02    


 


Hct  37.7 % (32.4-45.2)   08/19/20  06:02    


 


MCV  90.8 fl (80-96)   08/19/20  06:02    


 


MCHC  33.5 g/dl (32.0-36.0)   08/19/20  06:02    


 


RDW  13.3 % (11.6-15.6)   08/19/20  06:02    


 


Plt Count  259 K/MM3 (134-434)   08/19/20  06:02    


 


INR  0.96  (0.83-1.09)   08/18/20  15:50    


 


Sodium  144 mmol/L (136-145)   08/19/20  06:02    


 


Potassium  4.1 mmol/L (3.5-5.1)   08/19/20  06:02    


 


Chloride  109 mmol/L ()  H  08/19/20  06:02    


 


Carbon Dioxide  28 mmol/L (21-32)   08/19/20  06:02    


 


Anion Gap  7 MMOL/L (8-16)  L  08/19/20  06:02    


 


BUN  11.7 mg/dL (7-18)   08/19/20  06:02    


 


Creatinine  0.8 mg/dL (0.55-1.3)   08/19/20  06:02    


 


Random Glucose  87 mg/dL ()   08/19/20  06:02    


 


Calcium  8.7 mg/dL (8.5-10.1)   08/19/20  06:02    


 


Blood Type  O POSITIVE   08/18/20  15:50    


 


Antibody Screen  Negative   08/18/20  15:50    




















A/P: 80 y/o F w/ PMHx Covid 19 infection in 5/2020, hyperlipidemia, admitted for

 workup after TIA sxs. Vascular consulted for evaluation of L ICA stenosis. 





recent tia w/ 50-75% stenosis of L ICA





CTA neck ordered for further evaluation 


will follow up








d/w attending Dr Bruno

## 2020-08-21 LAB
ALBUMIN SERPL-MCNC: 3.5 G/DL (ref 3.4–5)
ALP SERPL-CCNC: 75 U/L (ref 45–117)
ALT SERPL-CCNC: 25 U/L (ref 13–61)
ANION GAP SERPL CALC-SCNC: 8 MMOL/L (ref 8–16)
AST SERPL-CCNC: 26 U/L (ref 15–37)
BASOPHILS # BLD: 0.8 % (ref 0–2)
BILIRUB SERPL-MCNC: 0.3 MG/DL (ref 0.2–1)
BUN SERPL-MCNC: 19.2 MG/DL (ref 7–18)
CALCIUM SERPL-MCNC: 9.1 MG/DL (ref 8.5–10.1)
CHLORIDE SERPL-SCNC: 107 MMOL/L (ref 98–107)
CO2 SERPL-SCNC: 27 MMOL/L (ref 21–32)
CREAT SERPL-MCNC: 0.8 MG/DL (ref 0.55–1.3)
DEPRECATED RDW RBC AUTO: 12.9 % (ref 11.6–15.6)
EOSINOPHIL # BLD: 3.2 % (ref 0–4.5)
GLUCOSE SERPL-MCNC: 97 MG/DL (ref 74–106)
HCT VFR BLD CALC: 38.9 % (ref 32.4–45.2)
HGB BLD-MCNC: 13.1 GM/DL (ref 10.7–15.3)
LYMPHOCYTES # BLD: 29.2 % (ref 8–40)
MCH RBC QN AUTO: 30.9 PG (ref 25.7–33.7)
MCHC RBC AUTO-ENTMCNC: 33.7 G/DL (ref 32–36)
MCV RBC: 91.6 FL (ref 80–96)
MONOCYTES # BLD AUTO: 8.8 % (ref 3.8–10.2)
NEUTROPHILS # BLD: 58 % (ref 42.8–82.8)
PLATELET # BLD AUTO: 271 K/MM3 (ref 134–434)
PMV BLD: 8 FL (ref 7.5–11.1)
POTASSIUM SERPLBLD-SCNC: 4.1 MMOL/L (ref 3.5–5.1)
PROT SERPL-MCNC: 6.5 G/DL (ref 6.4–8.2)
RBC # BLD AUTO: 4.24 M/MM3 (ref 3.6–5.2)
SODIUM SERPL-SCNC: 142 MMOL/L (ref 136–145)
WBC # BLD AUTO: 6.5 K/MM3 (ref 4–10)

## 2020-08-21 RX ADMIN — RAMIPRIL SCH MG: 2.5 CAPSULE ORAL at 09:14

## 2020-08-21 RX ADMIN — OMEGA-3-ACID ETHYL ESTERS SCH GM: 1 CAPSULE, LIQUID FILLED ORAL at 09:13

## 2020-08-21 RX ADMIN — ATORVASTATIN CALCIUM SCH MG: 80 TABLET, FILM COATED ORAL at 21:10

## 2020-08-21 RX ADMIN — ENOXAPARIN SODIUM SCH MG: 40 INJECTION SUBCUTANEOUS at 09:14

## 2020-08-21 RX ADMIN — ASPIRIN 81 MG SCH MG: 81 TABLET ORAL at 09:14

## 2020-08-21 RX ADMIN — OMEGA-3-ACID ETHYL ESTERS SCH GM: 1 CAPSULE, LIQUID FILLED ORAL at 21:07

## 2020-08-21 RX ADMIN — PANTOPRAZOLE SODIUM SCH MG: 40 TABLET, DELAYED RELEASE ORAL at 09:15

## 2020-08-21 NOTE — PN
Progress Note, Physician


Chief Complaint: 





Patient seen and examined at the bedside, no acute events from last night, no 

dyspnea, no chest pain.


History of Present Illness: 





This 79 yr old  female with PMH of HTN, HLD, s/p Covid 19 infection in 

May of 2020 admitted via ER with an acute TIA, slurred speech, right facial 

droop, and a left internal carotid artery stenosis 50 to 70%.





- Current Medication List


Current Medications: 


Active Medications





Aspirin (Asa -)  81 mg PO DAILY [facility]


   Last Admin: 08/20/20 10:25 Dose:  81 mg


   Documented by: 


Atorvastatin Calcium (Lipitor -)  80 mg PO HS [facility]


   Last Admin: 08/20/20 21:15 Dose:  80 mg


   Documented by: 


Enoxaparin Sodium (Lovenox -)  40 mg SQ DAILY [facility]


   Last Admin: 08/20/20 10:26 Dose:  40 mg


   Documented by: 


Omega-3-Acid Ethyl Esters (Lovaza -)  2 gm PO BID [facility]


   Last Admin: 08/20/20 21:15 Dose:  2 gm


   Documented by: 


Pantoprazole Sodium (Protonix -)  40 mg PO DAILY [facility]


   Last Admin: 08/20/20 10:26 Dose:  40 mg


   Documented by: 


Ramipril (Altace -)  2.5 mg PO DAILY [facility]


   Last Admin: 08/20/20 10:25 Dose:  2.5 mg


   Documented by: 











- Objective


Vital Signs: 


                                   Vital Signs











Temperature  97.6 F   08/21/20 05:36


 


Pulse Rate  65   08/21/20 05:36


 


Respiratory Rate  18   08/21/20 05:36


 


Blood Pressure  127/72   08/21/20 05:36


 


O2 Sat by Pulse Oximetry (%)  98   08/21/20 05:36











Constitutional: Yes: Well Nourished, No Distress, Calm


Eyes: Yes: Conjunctiva Clear, EOM Intact


HENT: Yes: Atraumatic, Normocephalic


Neck: Yes: Supple, Trachea Midline


Cardiovascular: Yes: Regular Rate and Rhythm


Respiratory: Yes: Regular, CTA Bilaterally


Gastrointestinal: Yes: Normal Bowel Sounds, Soft


...Rectal Exam: Yes: Deferred


Genitourinary: Yes: WNL


Breast(s): Yes: WNL


Musculoskeletal: Yes: WNL


Extremities: Yes: WNL


Edema: No


Peripheral Pulses WNL: Yes


Integumentary: Yes: WNL


Neurological: Yes: WNL


...Motor Strength: WNL


Psychiatric: Yes: WNL


Labs: 


                                    CBC, BMP





                                 08/21/20 05:58 





                                 08/21/20 05:58 





                                    INR, PTT











INR  0.96  (0.83-1.09)   08/18/20  15:50    














- ....Imaging


Other: Report Reviewed (lab data reviewed, Neurology and Vascular notes read and

appreciated)





Problem List





- Problems


(1) H/O hysterectomy for benign disease


Code(s): Z90.710 - ACQUIRED ABSENCE OF BOTH CERVIX AND UTERUS   





(2) History of appendectomy


Code(s): Z90.49 - ACQUIRED ABSENCE OF OTHER SPECIFIED PARTS OF DIGESTIVE TRACT  







(3) Chest tightness


Code(s): R07.89 - OTHER CHEST PAIN   





(4) Dyspnea


Code(s): R06.00 - DYSPNEA, UNSPECIFIED   





(5) SANTOS (dyspnea on exertion)


Code(s): R06.00 - DYSPNEA, UNSPECIFIED   





(6) Transient ischemic attack


Code(s): G45.9 - TRANSIENT CEREBRAL ISCHEMIC ATTACK, UNSPECIFIED   





(7) COVID-19 virus infection


Code(s): U07.1 - COVID POSITIVE   





(8) Dementia


Code(s): F03.90 - UNSPECIFIED DEMENTIA WITHOUT BEHAVIORAL DISTURBANCE   


Qualifiers: 


   Dementia type: unspecified type   Dementia behavioral disturbance: without 

behavioral disturbance   Qualified Code(s): F03.90 - Unspecified dementia 

without behavioral disturbance   





(9) Spinal stenosis at L4-L5 level


Code(s): M48.06 - SPINAL STENOSIS, LUMBAR REGION * DO NOT USE *   





(10) Suspected 2019 novel coronavirus infection


Code(s): R68.89 - OTHER GENERAL SYMPTOMS AND SIGNS   





(11) Stenosis of left internal carotid artery


Code(s): I65.22 - OCCLUSION AND STENOSIS OF LEFT CAROTID ARTERY   





Assessment/Plan





Assessment/plan: acute TIA, acute slurred speech and right facial droop 

resolved, HTN, HLD, s/p Covid 19 infection in May 2020, left internal carotid 

artery stenosis 70%; SQ Lovenox, aspirin for TIA, DVT/GI prophylaxis, physical 

therapy, Neck CTA pending, atorvastatin for TIA and HLD, omega 3 for 

hypertriglyceridemia, Ramipril for HTN.

## 2020-08-21 NOTE — PN
Progress Note (short form)





- Note


Progress Note: 








79 Year old Congolese speaking female, saw with her daughter. She came to hospital

or slurring of speech and facial droopiness , which resolved.


She was taking simvastatin 40 mg at home, no aspirin. Patient denies smoking or 

previous tia. She has ct head which was unremarkable, mri of brain is 

uremarkable. Her carotid shows 70 percent stenosis.


Patient is feeling back to normal. continue lipitor and aspirin


vascular surgery consult appreciated , cta results pending. No new complain








Neurological examination


Alert oriented x 2, speech is normal, vss, afebrile


eomi, pupils reactive and no face asymmetry


moving all ext


sensation is normal








ct head, mri is normal


carotid ultrasound left ica 70 percent stenosis





Assessment/Plan TIA, with left ica stenosis, no new symptoms 





Plan: continue lipitor and asprin 


- vascular surgery consult appreciated and cta results are pending


- life style modifications


- tele monitoring


- can be discharged, if  no acute intervention 





Thanking you so much


Bryan Rivera MD

## 2020-08-21 NOTE — PN
Progress Note, Physician


History of Present Illness: 





No further recurrence of slurred speech and right facial droop lasting 10 

minutes, since resolved, brain MRI w/o acute strokes, carotid US showed LICA 50-

70, neck CTA w/o sig stenosis, she is currently asymptomatic, denies near or 

true syncope, palpitations, orthopnea, PND or LE edema.








- Current Medication List


Current Medications: 


Active Medications





Aspirin (Asa -)  81 mg PO DAILY [facility]


   Last Admin: 08/20/20 10:25 Dose:  81 mg


   Documented by: 


Atorvastatin Calcium (Lipitor -)  80 mg PO HS [facility]


   Last Admin: 08/20/20 21:15 Dose:  80 mg


   Documented by: 


Enoxaparin Sodium (Lovenox -)  40 mg SQ DAILY [facility]


   Last Admin: 08/20/20 10:26 Dose:  40 mg


   Documented by: 


Omega-3-Acid Ethyl Esters (Lovaza -)  2 gm PO BID [facility]


   Last Admin: 08/20/20 21:15 Dose:  2 gm


   Documented by: 


Pantoprazole Sodium (Protonix -)  40 mg PO DAILY [facility]


   Last Admin: 08/20/20 10:26 Dose:  40 mg


   Documented by: 


Ramipril (Altace -)  2.5 mg PO DAILY [facility]


   Last Admin: 08/20/20 10:25 Dose:  2.5 mg


   Documented by: 











- Objective


Vital Signs: 


                                   Vital Signs











Temperature  97.6 F   08/21/20 05:36


 


Pulse Rate  65   08/21/20 05:36


 


Respiratory Rate  18   08/21/20 05:36


 


Blood Pressure  127/72   08/21/20 05:36


 


O2 Sat by Pulse Oximetry (%)  98   08/21/20 05:36











Constitutional: Yes: No Distress, Calm


Neck: Yes: Supple


Cardiovascular: Yes: Regular Rate and Rhythm


Respiratory: Yes: Regular, CTA Bilaterally


Gastrointestinal: Yes: Normal Bowel Sounds, Soft


Edema: No


Labs: 


                                    CBC, BMP





                                 08/21/20 05:58 





                                 08/21/20 05:58 





                                    INR, PTT











INR  0.96  (0.83-1.09)   08/18/20  15:50    














- ....Imaging


EKG: Report Reviewed (Tele: NSR no PAF)





Problem List





- Problems


(1) Mixed hyperlipidemia


Code(s): E78.2 - MIXED HYPERLIPIDEMIA   





(2) Chest tightness


Code(s): R07.89 - OTHER CHEST PAIN   





(3) SANTOS (dyspnea on exertion)


Code(s): R06.00 - DYSPNEA, UNSPECIFIED   





(4) Transient ischemic attack


Code(s): G45.9 - TRANSIENT CEREBRAL ISCHEMIC ATTACK, UNSPECIFIED   





(5) Dementia


Code(s): F03.90 - UNSPECIFIED DEMENTIA WITHOUT BEHAVIORAL DISTURBANCE   


Qualifiers: 


   Dementia type: unspecified type   Dementia behavioral disturbance: without 

behavioral disturbance   Qualified Code(s): F03.90 - Unspecified dementia 

without behavioral disturbance   





(6) Hypertensive heart disease


Code(s): I11.9 - HYPERTENSIVE HEART DISEASE WITHOUT HEART FAILURE   


Qualifiers: 


   Heart failure presence: without heart failure   Qualified Code(s): I11.9 - 

Hypertensive heart disease without heart failure   





Assessment/Plan


8/2020 carotid US showed LICA 50-70%


12/28/2018 Echo: Normal LV size and fxn, mild MR





1. Acute slurred speech and right facial droop c/w acute TIA since resolved


2. SANTOS and chest tightness with exertion r/o CAD


3. HTN


4. Mixed hyperlipidemia


5. Non-obstructive LICA stenosis





P1. Continue Lipitor 80 qd, Lovaza 2 bid, ramipril 2.5 qd, ASA 81 qd, f/u repeat

echo, telemetry monitor r/o PAF, neck CTA report negative for sig ICA stenosis


2. Pharm stress as outpatient r/o CAD


3. Extended arrhythmia monitoring as outpatient if PAF not seen on telemetry


4. Encourage ambulation, PT as tolerated, d/c planning

## 2020-08-22 VITALS — HEART RATE: 78 BPM | DIASTOLIC BLOOD PRESSURE: 72 MMHG | SYSTOLIC BLOOD PRESSURE: 145 MMHG | TEMPERATURE: 97.5 F

## 2020-08-22 RX ADMIN — OMEGA-3-ACID ETHYL ESTERS SCH GM: 1 CAPSULE, LIQUID FILLED ORAL at 09:51

## 2020-08-22 RX ADMIN — PANTOPRAZOLE SODIUM SCH MG: 40 TABLET, DELAYED RELEASE ORAL at 09:50

## 2020-08-22 RX ADMIN — ENOXAPARIN SODIUM SCH MG: 40 INJECTION SUBCUTANEOUS at 09:50

## 2020-08-22 RX ADMIN — RAMIPRIL SCH MG: 2.5 CAPSULE ORAL at 09:51

## 2020-08-22 RX ADMIN — ASPIRIN 81 MG SCH MG: 81 TABLET ORAL at 09:50

## 2020-08-22 NOTE — PN
Progress Note (short form)





- Note


Progress Note: 








79 Year old [place] speaking female, saw with her daughter. She came to hospital

or slurring of speech and facial droopiness , which resolved.


She was taking simvastatin 40 mg at home, no aspirin. Patient denies smoking or 

previous tia. She has ct head which was unremarkable, mri of brain is 

uremarkable. Her carotid shows 70 percent stenosis.


Patient is feeling back to normal. continue lipitor and aspirin


no stenosis on cta, no new complain








Neurological examination


Alert oriented x 2, speech is normal, vss, afebrile


eomi, pupils reactive and no face asymmetry


moving all ext


sensation is normal








ct head, mri is normal


carotid ultrasound left ica 70 percent stenosis





Assessment/Plan TIA, with left ica stenosis, no new symptoms 





Plan: continue lipitor and asprin 


- vascular surgery consult appreciated and cta did not show any stenosis


- life style modifications


- follow u outpatient 





Thanking you so much


Bryan Rivera MD

## 2020-08-22 NOTE — DS
Physical Examination


Vital Signs: 


                                   Vital Signs











Temperature  97.6 F   08/22/20 02:10


 


Pulse Rate  62   08/22/20 02:10


 


Respiratory Rate  18   08/22/20 02:10


 


Blood Pressure  115/61   08/22/20 02:10


 


O2 Sat by Pulse Oximetry (%)  96   08/22/20 02:10











Labs: 


                                    CBC, BMP





                                 08/21/20 05:58 





                                 08/21/20 05:58 











Discharge Summary


Problems reviewed: Yes


Reason For Visit: DYSPNEA ON EXERTION TRANSIENT ICHEMIC ATTACK


Current Active Problems





Chest tightness (Acute)


SANTOS (dyspnea on exertion) (Acute)


Dyspnea (Acute)


H/O hysterectomy for benign disease (Acute)


History of appendectomy (Acute)


Hypertensive heart disease (Acute)


Mixed hyperlipidemia (Acute)


Transient ischemic attack (Acute)








Condition: Stable





- Instructions


Diet, Activity, Other Instructions: 


Continue present meds.


Activity as tolerated.


See an Endocrinologist for a thyroid nodule.


Get a list of Endocrinologists from the hospital.


Follow up with Dr. Lopez within one week.


Total time spent over 30 minutes.





Referrals: 


Sulma Lopez MD [Primary Care Provider] - 


Disposition: HOME





- Home Medications


Comprehensive Discharge Medication List: 


Ambulatory Orders





Ascorbic Acid [Vitamin C -] 500 mg PO BID #14 tablet 04/16/20 


Zinc Sulfate [Orazinc] 220 mg PO BID #14 capsule 04/16/20 


Aspirin [ASA -] 81 mg PO DAILY #30 tab.chew 08/22/20 


Atorvastatin Ca [Lipitor] 80 mg PO HS #30 tablet 08/22/20 


Omega-3 Acid Ethyl Esters [Lovaza -] 2 gm PO BID #120 cap 08/22/20 


Pantoprazole Sodium [Protonix -] 40 mg PO DAILY #30 tablet.ec 08/22/20 


Ramipril [Altace] 2.5 mg PO DAILY #30 capsule 08/22/20

## 2022-09-06 ENCOUNTER — RX ONLY (RX ONLY)
Age: 82
End: 2022-09-06

## 2022-09-06 ENCOUNTER — OFFICE (OUTPATIENT)
Dept: URBAN - METROPOLITAN AREA CLINIC 121 | Facility: CLINIC | Age: 82
Setting detail: OPHTHALMOLOGY
End: 2022-09-06
Payer: MEDICARE

## 2022-09-06 DIAGNOSIS — H16.223: ICD-10-CM

## 2022-09-06 DIAGNOSIS — H26.493: ICD-10-CM

## 2022-09-06 DIAGNOSIS — H35.363: ICD-10-CM

## 2022-09-06 DIAGNOSIS — H35.033: ICD-10-CM

## 2022-09-06 PROBLEM — Z96.1 PSEUDOPHAKIA ; BOTH EYES: Status: ACTIVE | Noted: 2022-09-06

## 2022-09-06 PROCEDURE — 92004 COMPRE OPH EXAM NEW PT 1/>: CPT | Performed by: OPHTHALMOLOGY

## 2022-09-06 PROCEDURE — 92250 FUNDUS PHOTOGRAPHY W/I&R: CPT | Performed by: OPHTHALMOLOGY

## 2022-09-06 ASSESSMENT — SUPERFICIAL PUNCTATE KERATITIS (SPK)
OD_SPK: T
OS_SPK: T

## 2022-09-06 ASSESSMENT — REFRACTION_AUTOREFRACTION
OD_CYLINDER: +0.75
OD_AXIS: 150
OS_CYLINDER: +1.75
OS_SPHERE: -1.00
OD_SPHERE: -2.25
OS_AXIS: 029

## 2022-09-06 ASSESSMENT — AXIALLENGTH_DERIVED
OD_AL: 24.6929
OS_AL: 23.9679

## 2022-09-06 ASSESSMENT — KERATOMETRY
OS_K1POWER_DIOPTERS: 41.75
OD_K1POWER_DIOPTERS: 42.25
OD_AXISANGLE_DEGREES: 144
OD_K2POWER_DIOPTERS: 43.00
METHOD_AUTO_MANUAL: AUTO
OS_K2POWER_DIOPTERS: 43.50
OS_AXISANGLE_DEGREES: 036

## 2022-09-06 ASSESSMENT — TONOMETRY
OD_IOP_MMHG: 15
OS_IOP_MMHG: 15

## 2022-09-06 ASSESSMENT — PACHYMETRY
OS_CT_CORRECTION: 1
OS_CT_UM: 536
OD_CT_UM: 531
OD_CT_CORRECTION: 1

## 2022-09-06 ASSESSMENT — CONFRONTATIONAL VISUAL FIELD TEST (CVF)
OS_FINDINGS: FULL
OD_FINDINGS: FULL

## 2022-09-06 ASSESSMENT — VISUAL ACUITY
OS_BCVA: 20/40
OD_BCVA: 20/40

## 2022-09-06 ASSESSMENT — SPHEQUIV_DERIVED
OS_SPHEQUIV: -0.125
OD_SPHEQUIV: -1.875